# Patient Record
Sex: FEMALE | Race: WHITE | NOT HISPANIC OR LATINO
[De-identification: names, ages, dates, MRNs, and addresses within clinical notes are randomized per-mention and may not be internally consistent; named-entity substitution may affect disease eponyms.]

---

## 2017-01-07 ENCOUNTER — APPOINTMENT (OUTPATIENT)
Dept: ORTHOPEDIC SURGERY | Facility: CLINIC | Age: 57
End: 2017-01-07

## 2017-01-07 VITALS
SYSTOLIC BLOOD PRESSURE: 125 MMHG | WEIGHT: 194 LBS | HEIGHT: 68 IN | BODY MASS INDEX: 29.4 KG/M2 | HEART RATE: 61 BPM | DIASTOLIC BLOOD PRESSURE: 82 MMHG

## 2017-01-07 DIAGNOSIS — M17.0 BILATERAL PRIMARY OSTEOARTHRITIS OF KNEE: ICD-10-CM

## 2017-02-08 ENCOUNTER — OUTPATIENT (OUTPATIENT)
Dept: OUTPATIENT SERVICES | Facility: HOSPITAL | Age: 57
LOS: 1 days | End: 2017-02-08
Payer: COMMERCIAL

## 2017-02-08 VITALS
OXYGEN SATURATION: 97 % | WEIGHT: 197.09 LBS | HEART RATE: 70 BPM | SYSTOLIC BLOOD PRESSURE: 121 MMHG | TEMPERATURE: 98 F | HEIGHT: 66 IN | DIASTOLIC BLOOD PRESSURE: 82 MMHG

## 2017-02-08 DIAGNOSIS — Z98.890 OTHER SPECIFIED POSTPROCEDURAL STATES: Chronic | ICD-10-CM

## 2017-02-08 DIAGNOSIS — M17.0 BILATERAL PRIMARY OSTEOARTHRITIS OF KNEE: ICD-10-CM

## 2017-02-08 DIAGNOSIS — Z01.818 ENCOUNTER FOR OTHER PREPROCEDURAL EXAMINATION: ICD-10-CM

## 2017-02-08 DIAGNOSIS — Z98.891 HISTORY OF UTERINE SCAR FROM PREVIOUS SURGERY: Chronic | ICD-10-CM

## 2017-02-08 DIAGNOSIS — K11.9 DISEASE OF SALIVARY GLAND, UNSPECIFIED: Chronic | ICD-10-CM

## 2017-02-08 DIAGNOSIS — F17.200 NICOTINE DEPENDENCE, UNSPECIFIED, UNCOMPLICATED: ICD-10-CM

## 2017-02-08 LAB
ALBUMIN SERPL ELPH-MCNC: 3.7 G/DL — SIGNIFICANT CHANGE UP (ref 3.3–5)
ALP SERPL-CCNC: 68 U/L — SIGNIFICANT CHANGE UP (ref 30–120)
ALT FLD-CCNC: 24 U/L DA — SIGNIFICANT CHANGE UP (ref 10–60)
ANION GAP SERPL CALC-SCNC: 8 MMOL/L — SIGNIFICANT CHANGE UP (ref 5–17)
APTT BLD: 29.4 SEC — SIGNIFICANT CHANGE UP (ref 27.5–37.4)
AST SERPL-CCNC: 15 U/L — SIGNIFICANT CHANGE UP (ref 10–40)
BILIRUB SERPL-MCNC: 0.2 MG/DL — SIGNIFICANT CHANGE UP (ref 0.2–1.2)
BLD GP AB SCN SERPL QL: SIGNIFICANT CHANGE UP
BUN SERPL-MCNC: 14 MG/DL — SIGNIFICANT CHANGE UP (ref 7–23)
CALCIUM SERPL-MCNC: 9.3 MG/DL — SIGNIFICANT CHANGE UP (ref 8.4–10.5)
CHLORIDE SERPL-SCNC: 105 MMOL/L — SIGNIFICANT CHANGE UP (ref 96–108)
CO2 SERPL-SCNC: 27 MMOL/L — SIGNIFICANT CHANGE UP (ref 22–31)
CREAT SERPL-MCNC: 0.6 MG/DL — SIGNIFICANT CHANGE UP (ref 0.5–1.3)
GLUCOSE SERPL-MCNC: 108 MG/DL — HIGH (ref 70–99)
HCT VFR BLD CALC: 41.2 % — SIGNIFICANT CHANGE UP (ref 34.5–45)
HGB BLD-MCNC: 13.6 G/DL — SIGNIFICANT CHANGE UP (ref 11.5–15.5)
INR BLD: 0.96 RATIO — SIGNIFICANT CHANGE UP (ref 0.88–1.16)
MCHC RBC-ENTMCNC: 30 PG — SIGNIFICANT CHANGE UP (ref 27–34)
MCHC RBC-ENTMCNC: 33 GM/DL — SIGNIFICANT CHANGE UP (ref 32–36)
MCV RBC AUTO: 90.8 FL — SIGNIFICANT CHANGE UP (ref 80–100)
MRSA PCR RESULT.: SIGNIFICANT CHANGE UP
PLATELET # BLD AUTO: 247 K/UL — SIGNIFICANT CHANGE UP (ref 150–400)
POTASSIUM SERPL-MCNC: 3.9 MMOL/L — SIGNIFICANT CHANGE UP (ref 3.5–5.3)
POTASSIUM SERPL-SCNC: 3.9 MMOL/L — SIGNIFICANT CHANGE UP (ref 3.5–5.3)
PROT SERPL-MCNC: 7.1 G/DL — SIGNIFICANT CHANGE UP (ref 6–8.3)
PROTHROM AB SERPL-ACNC: 10.8 SEC — SIGNIFICANT CHANGE UP (ref 10–13.1)
RBC # BLD: 4.54 M/UL — SIGNIFICANT CHANGE UP (ref 3.8–5.2)
RBC # FLD: 13.6 % — SIGNIFICANT CHANGE UP (ref 10.3–14.5)
S AUREUS DNA NOSE QL NAA+PROBE: SIGNIFICANT CHANGE UP
SODIUM SERPL-SCNC: 140 MMOL/L — SIGNIFICANT CHANGE UP (ref 135–145)
WBC # BLD: 8.1 K/UL — SIGNIFICANT CHANGE UP (ref 3.8–10.5)
WBC # FLD AUTO: 8.1 K/UL — SIGNIFICANT CHANGE UP (ref 3.8–10.5)

## 2017-02-08 PROCEDURE — 93010 ELECTROCARDIOGRAM REPORT: CPT

## 2017-02-08 RX ORDER — DICLOFENAC SODIUM 75 MG/1
1 TABLET, DELAYED RELEASE ORAL
Qty: 0 | Refills: 0 | COMMUNITY

## 2017-02-08 NOTE — H&P PST ADULT - NSANTHOSAYNRD_GEN_A_CORE
No. ADDISON screening performed.  STOP BANG Legend: 0-2 = LOW Risk; 3-4 = INTERMEDIATE Risk; 5-8 = HIGH Risk

## 2017-02-08 NOTE — H&P PST ADULT - PROBLEM SELECTOR PLAN 1
'Right total knee replacement stage 1" on 3/1 and left total knee replacement on 3/6/17.  Diagnostic testing will be forwarded for medical clearance.  Pre op instructions weer reviewed and signed.  Anesthesia consult requested.

## 2017-02-08 NOTE — H&P PST ADULT - VENOUS THROMBOEMBOLISM FOR WOMEN ONLY
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

## 2017-02-08 NOTE — H&P PST ADULT - PMH
Obesity (BMI 30.0-34.9)    Osteoarthritis of both knees    Smoker    Spinal stenosis of lumbar region

## 2017-02-08 NOTE — H&P PST ADULT - MUSCULOSKELETAL
details… detailed exam decreased ROM to bilateral knees/no joint erythema/decreased ROM/no calf tenderness/decreased ROM due to pain/no joint warmth/joint swelling

## 2017-02-08 NOTE — H&P PST ADULT - RS GEN PE MLT RESP DETAILS PC
respirations non-labored/no subcutaneous emphysema/no rales/no rhonchi/no chest wall tenderness/breath sounds equal/no wheezes/no intercostal retractions/clear to auscultation bilaterally/good air movement

## 2017-02-08 NOTE — H&P PST ADULT - PSH
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

## 2017-02-08 NOTE — H&P PST ADULT - ASSESSMENT
55 yo female is scheduled for right total knee replacement stage 1 on 3/1/17 and left total knee replacement on 3/6/17.

## 2017-02-08 NOTE — H&P PST ADULT - NEGATIVE ENMT SYMPTOMS
no nasal discharge/no nose bleeds/no dry mouth/no throat pain/no abnormal taste sensation/no vertigo/no dysphagia/no hearing difficulty/no gum bleeding/no sinus symptoms/no ear pain/no nasal congestion/no nasal obstruction/no post-nasal discharge/no recurrent cold sores/no tinnitus

## 2017-02-08 NOTE — H&P PST ADULT - NEGATIVE NEUROLOGICAL SYMPTOMS
no generalized seizures/no vertigo/no transient paralysis/no weakness/no loss of sensation/no paresthesias/no tremors

## 2017-02-08 NOTE — H&P PST ADULT - HISTORY OF PRESENT ILLNESS
55 yo female is scheduled for "right total knee replacement stage 1" on 3/1/17 with Maury Rodriguez MD and left total knee replacement on 3/6/17  Patient complains of bilateral knee pain with osteoarthritis for 7 years, tried NSAIDs, cortisone, epidural, gel injections without relief. complains of 10/10 constant, radiating to back with swelling.

## 2017-02-08 NOTE — H&P PST ADULT - FAMILY HISTORY
Father  Still living? No  Family history of heart disease, Age at diagnosis: Age Unknown     Mother  Still living? No  Family history of diabetes mellitus, Age at diagnosis: Age Unknown

## 2017-02-17 RX ORDER — TRANEXAMIC ACID 100 MG/ML
1000 INJECTION, SOLUTION INTRAVENOUS ONCE
Qty: 0 | Refills: 0 | Status: COMPLETED | OUTPATIENT
Start: 2017-03-01 | End: 2017-03-01

## 2017-02-28 ENCOUNTER — RESULT REVIEW (OUTPATIENT)
Age: 57
End: 2017-02-28

## 2017-02-28 RX ORDER — SODIUM CHLORIDE 9 MG/ML
1000 INJECTION, SOLUTION INTRAVENOUS
Qty: 0 | Refills: 0 | Status: DISCONTINUED | OUTPATIENT
Start: 2017-03-01 | End: 2017-03-06

## 2017-02-28 RX ORDER — SENNA PLUS 8.6 MG/1
2 TABLET ORAL AT BEDTIME
Qty: 0 | Refills: 0 | Status: DISCONTINUED | OUTPATIENT
Start: 2017-03-01 | End: 2017-03-06

## 2017-02-28 RX ORDER — POLYETHYLENE GLYCOL 3350 17 G/17G
17 POWDER, FOR SOLUTION ORAL DAILY
Qty: 0 | Refills: 0 | Status: DISCONTINUED | OUTPATIENT
Start: 2017-03-01 | End: 2017-03-06

## 2017-02-28 RX ORDER — CELECOXIB 200 MG/1
200 CAPSULE ORAL ONCE
Qty: 0 | Refills: 0 | Status: COMPLETED | OUTPATIENT
Start: 2017-03-01 | End: 2017-03-01

## 2017-02-28 RX ORDER — APREPITANT 80 MG/1
40 CAPSULE ORAL ONCE
Qty: 0 | Refills: 0 | Status: COMPLETED | OUTPATIENT
Start: 2017-03-01 | End: 2017-03-01

## 2017-02-28 RX ORDER — MAGNESIUM HYDROXIDE 400 MG/1
30 TABLET, CHEWABLE ORAL DAILY
Qty: 0 | Refills: 0 | Status: DISCONTINUED | OUTPATIENT
Start: 2017-03-01 | End: 2017-03-06

## 2017-02-28 RX ORDER — OXYCODONE HYDROCHLORIDE 5 MG/1
10 TABLET ORAL ONCE
Qty: 0 | Refills: 0 | Status: DISCONTINUED | OUTPATIENT
Start: 2017-03-01 | End: 2017-03-01

## 2017-02-28 RX ORDER — DOCUSATE SODIUM 100 MG
100 CAPSULE ORAL THREE TIMES A DAY
Qty: 0 | Refills: 0 | Status: DISCONTINUED | OUTPATIENT
Start: 2017-03-01 | End: 2017-03-06

## 2017-02-28 RX ORDER — ONDANSETRON 8 MG/1
4 TABLET, FILM COATED ORAL EVERY 6 HOURS
Qty: 0 | Refills: 0 | Status: DISCONTINUED | OUTPATIENT
Start: 2017-03-01 | End: 2017-03-06

## 2017-03-01 ENCOUNTER — APPOINTMENT (OUTPATIENT)
Dept: ORTHOPEDIC SURGERY | Facility: HOSPITAL | Age: 57
End: 2017-03-01

## 2017-03-01 ENCOUNTER — INPATIENT (INPATIENT)
Facility: HOSPITAL | Age: 57
LOS: 6 days | Discharge: NURSING, REHAB OR LONG-TERM CARE | DRG: 462 | End: 2017-03-08
Attending: INTERNAL MEDICINE | Admitting: ORTHOPAEDIC SURGERY
Payer: COMMERCIAL

## 2017-03-01 VITALS
TEMPERATURE: 98 F | HEIGHT: 67.5 IN | WEIGHT: 205.47 LBS | DIASTOLIC BLOOD PRESSURE: 80 MMHG | HEART RATE: 77 BPM | OXYGEN SATURATION: 99 % | RESPIRATION RATE: 15 BRPM | SYSTOLIC BLOOD PRESSURE: 151 MMHG

## 2017-03-01 DIAGNOSIS — K11.9 DISEASE OF SALIVARY GLAND, UNSPECIFIED: Chronic | ICD-10-CM

## 2017-03-01 DIAGNOSIS — Z98.891 HISTORY OF UTERINE SCAR FROM PREVIOUS SURGERY: Chronic | ICD-10-CM

## 2017-03-01 DIAGNOSIS — M17.0 BILATERAL PRIMARY OSTEOARTHRITIS OF KNEE: ICD-10-CM

## 2017-03-01 DIAGNOSIS — Z98.890 OTHER SPECIFIED POSTPROCEDURAL STATES: Chronic | ICD-10-CM

## 2017-03-01 LAB
ABO RH CONFIRMATION: SIGNIFICANT CHANGE UP
ANION GAP SERPL CALC-SCNC: 6 MMOL/L — SIGNIFICANT CHANGE UP (ref 5–17)
BUN SERPL-MCNC: 9 MG/DL — SIGNIFICANT CHANGE UP (ref 7–23)
CALCIUM SERPL-MCNC: 8.8 MG/DL — SIGNIFICANT CHANGE UP (ref 8.4–10.5)
CHLORIDE SERPL-SCNC: 106 MMOL/L — SIGNIFICANT CHANGE UP (ref 96–108)
CO2 SERPL-SCNC: 27 MMOL/L — SIGNIFICANT CHANGE UP (ref 22–31)
CREAT SERPL-MCNC: 0.6 MG/DL — SIGNIFICANT CHANGE UP (ref 0.5–1.3)
GLUCOSE SERPL-MCNC: 198 MG/DL — HIGH (ref 70–99)
HCG UR QL: NEGATIVE — SIGNIFICANT CHANGE UP
HCT VFR BLD CALC: 35.3 % — SIGNIFICANT CHANGE UP (ref 34.5–45)
HGB BLD-MCNC: 11.6 G/DL — SIGNIFICANT CHANGE UP (ref 11.5–15.5)
POTASSIUM SERPL-MCNC: 4.1 MMOL/L — SIGNIFICANT CHANGE UP (ref 3.5–5.3)
POTASSIUM SERPL-SCNC: 4.1 MMOL/L — SIGNIFICANT CHANGE UP (ref 3.5–5.3)
SODIUM SERPL-SCNC: 139 MMOL/L — SIGNIFICANT CHANGE UP (ref 135–145)

## 2017-03-01 PROCEDURE — 27447 TOTAL KNEE ARTHROPLASTY: CPT | Mod: RT

## 2017-03-01 PROCEDURE — 27447 TOTAL KNEE ARTHROPLASTY: CPT | Mod: AS,LT

## 2017-03-01 PROCEDURE — 88305 TISSUE EXAM BY PATHOLOGIST: CPT | Mod: 26

## 2017-03-01 PROCEDURE — 88311 DECALCIFY TISSUE: CPT | Mod: 26

## 2017-03-01 PROCEDURE — 73562 X-RAY EXAM OF KNEE 3: CPT | Mod: 26,RT

## 2017-03-01 PROCEDURE — 99222 1ST HOSP IP/OBS MODERATE 55: CPT

## 2017-03-01 RX ORDER — HYDROMORPHONE HYDROCHLORIDE 2 MG/ML
0.5 INJECTION INTRAMUSCULAR; INTRAVENOUS; SUBCUTANEOUS
Qty: 0 | Refills: 0 | Status: DISCONTINUED | OUTPATIENT
Start: 2017-03-01 | End: 2017-03-06

## 2017-03-01 RX ORDER — HYDROMORPHONE HYDROCHLORIDE 2 MG/ML
0.5 INJECTION INTRAMUSCULAR; INTRAVENOUS; SUBCUTANEOUS
Qty: 0 | Refills: 0 | Status: DISCONTINUED | OUTPATIENT
Start: 2017-03-01 | End: 2017-03-01

## 2017-03-01 RX ORDER — ACETAMINOPHEN 500 MG
1000 TABLET ORAL ONCE
Qty: 0 | Refills: 0 | Status: COMPLETED | OUTPATIENT
Start: 2017-03-01 | End: 2017-03-01

## 2017-03-01 RX ORDER — CEFAZOLIN SODIUM 1 G
2000 VIAL (EA) INJECTION ONCE
Qty: 0 | Refills: 0 | Status: COMPLETED | OUTPATIENT
Start: 2017-03-01 | End: 2017-03-01

## 2017-03-01 RX ORDER — OXYCODONE HYDROCHLORIDE 5 MG/1
5 TABLET ORAL
Qty: 0 | Refills: 0 | Status: DISCONTINUED | OUTPATIENT
Start: 2017-03-01 | End: 2017-03-02

## 2017-03-01 RX ORDER — MEPERIDINE HYDROCHLORIDE 50 MG/ML
12.5 INJECTION INTRAMUSCULAR; INTRAVENOUS; SUBCUTANEOUS
Qty: 0 | Refills: 0 | Status: DISCONTINUED | OUTPATIENT
Start: 2017-03-01 | End: 2017-03-01

## 2017-03-01 RX ORDER — ACETAMINOPHEN 500 MG
1000 TABLET ORAL EVERY 8 HOURS
Qty: 0 | Refills: 0 | Status: DISCONTINUED | OUTPATIENT
Start: 2017-03-02 | End: 2017-03-06

## 2017-03-01 RX ORDER — OXYCODONE HYDROCHLORIDE 5 MG/1
10 TABLET ORAL
Qty: 0 | Refills: 0 | Status: DISCONTINUED | OUTPATIENT
Start: 2017-03-01 | End: 2017-03-02

## 2017-03-01 RX ORDER — PANTOPRAZOLE SODIUM 20 MG/1
40 TABLET, DELAYED RELEASE ORAL
Qty: 0 | Refills: 0 | Status: DISCONTINUED | OUTPATIENT
Start: 2017-03-01 | End: 2017-03-06

## 2017-03-01 RX ORDER — ENOXAPARIN SODIUM 100 MG/ML
30 INJECTION SUBCUTANEOUS EVERY 12 HOURS
Qty: 0 | Refills: 0 | Status: COMPLETED | OUTPATIENT
Start: 2017-03-02 | End: 2017-03-05

## 2017-03-01 RX ORDER — FAMOTIDINE 10 MG/ML
1 INJECTION INTRAVENOUS
Qty: 0 | Refills: 0 | COMMUNITY

## 2017-03-01 RX ORDER — CELECOXIB 200 MG/1
200 CAPSULE ORAL
Qty: 0 | Refills: 0 | Status: DISCONTINUED | OUTPATIENT
Start: 2017-03-01 | End: 2017-03-06

## 2017-03-01 RX ORDER — ACETAMINOPHEN 500 MG
1000 TABLET ORAL EVERY 6 HOURS
Qty: 0 | Refills: 0 | Status: COMPLETED | OUTPATIENT
Start: 2017-03-01 | End: 2017-03-02

## 2017-03-01 RX ORDER — SODIUM CHLORIDE 9 MG/ML
1000 INJECTION, SOLUTION INTRAVENOUS
Qty: 0 | Refills: 0 | Status: DISCONTINUED | OUTPATIENT
Start: 2017-03-01 | End: 2017-03-01

## 2017-03-01 RX ORDER — CEFAZOLIN SODIUM 1 G
2000 VIAL (EA) INJECTION EVERY 8 HOURS
Qty: 0 | Refills: 0 | Status: COMPLETED | OUTPATIENT
Start: 2017-03-01 | End: 2017-03-02

## 2017-03-01 RX ORDER — HYDROMORPHONE HYDROCHLORIDE 2 MG/ML
0.5 INJECTION INTRAMUSCULAR; INTRAVENOUS; SUBCUTANEOUS ONCE
Qty: 0 | Refills: 0 | Status: DISCONTINUED | OUTPATIENT
Start: 2017-03-01 | End: 2017-03-01

## 2017-03-01 RX ADMIN — HYDROMORPHONE HYDROCHLORIDE 0.5 MILLIGRAM(S): 2 INJECTION INTRAMUSCULAR; INTRAVENOUS; SUBCUTANEOUS at 17:55

## 2017-03-01 RX ADMIN — HYDROMORPHONE HYDROCHLORIDE 0.5 MILLIGRAM(S): 2 INJECTION INTRAMUSCULAR; INTRAVENOUS; SUBCUTANEOUS at 23:02

## 2017-03-01 RX ADMIN — Medication 1000 MILLIGRAM(S): at 20:17

## 2017-03-01 RX ADMIN — Medication 100 MILLIGRAM(S): at 19:45

## 2017-03-01 RX ADMIN — APREPITANT 40 MILLIGRAM(S): 80 CAPSULE ORAL at 10:29

## 2017-03-01 RX ADMIN — HYDROMORPHONE HYDROCHLORIDE 0.5 MILLIGRAM(S): 2 INJECTION INTRAMUSCULAR; INTRAVENOUS; SUBCUTANEOUS at 22:32

## 2017-03-01 RX ADMIN — CELECOXIB 200 MILLIGRAM(S): 200 CAPSULE ORAL at 10:27

## 2017-03-01 RX ADMIN — HYDROMORPHONE HYDROCHLORIDE 0.5 MILLIGRAM(S): 2 INJECTION INTRAMUSCULAR; INTRAVENOUS; SUBCUTANEOUS at 17:35

## 2017-03-01 RX ADMIN — Medication 400 MILLIGRAM(S): at 18:55

## 2017-03-01 RX ADMIN — OXYCODONE HYDROCHLORIDE 10 MILLIGRAM(S): 5 TABLET ORAL at 10:27

## 2017-03-01 RX ADMIN — HYDROMORPHONE HYDROCHLORIDE 0.5 MILLIGRAM(S): 2 INJECTION INTRAMUSCULAR; INTRAVENOUS; SUBCUTANEOUS at 21:34

## 2017-03-01 RX ADMIN — Medication 100 MILLIGRAM(S): at 21:35

## 2017-03-01 RX ADMIN — HYDROMORPHONE HYDROCHLORIDE 0.5 MILLIGRAM(S): 2 INJECTION INTRAMUSCULAR; INTRAVENOUS; SUBCUTANEOUS at 22:04

## 2017-03-02 LAB
ANION GAP SERPL CALC-SCNC: 5 MMOL/L — SIGNIFICANT CHANGE UP (ref 5–17)
BUN SERPL-MCNC: 10 MG/DL — SIGNIFICANT CHANGE UP (ref 7–23)
CALCIUM SERPL-MCNC: 8.7 MG/DL — SIGNIFICANT CHANGE UP (ref 8.4–10.5)
CHLORIDE SERPL-SCNC: 106 MMOL/L — SIGNIFICANT CHANGE UP (ref 96–108)
CO2 SERPL-SCNC: 28 MMOL/L — SIGNIFICANT CHANGE UP (ref 22–31)
CREAT SERPL-MCNC: 0.51 MG/DL — SIGNIFICANT CHANGE UP (ref 0.5–1.3)
GLUCOSE SERPL-MCNC: 97 MG/DL — SIGNIFICANT CHANGE UP (ref 70–99)
HCT VFR BLD CALC: 33.7 % — LOW (ref 34.5–45)
HGB BLD-MCNC: 11 G/DL — LOW (ref 11.5–15.5)
MCHC RBC-ENTMCNC: 30.1 PG — SIGNIFICANT CHANGE UP (ref 27–34)
MCHC RBC-ENTMCNC: 32.8 GM/DL — SIGNIFICANT CHANGE UP (ref 32–36)
MCV RBC AUTO: 91.9 FL — SIGNIFICANT CHANGE UP (ref 80–100)
PLATELET # BLD AUTO: 212 K/UL — SIGNIFICANT CHANGE UP (ref 150–400)
POTASSIUM SERPL-MCNC: 4.3 MMOL/L — SIGNIFICANT CHANGE UP (ref 3.5–5.3)
POTASSIUM SERPL-SCNC: 4.3 MMOL/L — SIGNIFICANT CHANGE UP (ref 3.5–5.3)
RBC # BLD: 3.66 M/UL — LOW (ref 3.8–5.2)
RBC # FLD: 13.6 % — SIGNIFICANT CHANGE UP (ref 10.3–14.5)
SODIUM SERPL-SCNC: 139 MMOL/L — SIGNIFICANT CHANGE UP (ref 135–145)
WBC # BLD: 9.1 K/UL — SIGNIFICANT CHANGE UP (ref 3.8–10.5)
WBC # FLD AUTO: 9.1 K/UL — SIGNIFICANT CHANGE UP (ref 3.8–10.5)

## 2017-03-02 PROCEDURE — 93971 EXTREMITY STUDY: CPT | Mod: 26,LT

## 2017-03-02 PROCEDURE — 99232 SBSQ HOSP IP/OBS MODERATE 35: CPT

## 2017-03-02 RX ORDER — OXYCODONE HYDROCHLORIDE 5 MG/1
15 TABLET ORAL
Qty: 0 | Refills: 0 | Status: DISCONTINUED | OUTPATIENT
Start: 2017-03-02 | End: 2017-03-06

## 2017-03-02 RX ORDER — HYDROMORPHONE HYDROCHLORIDE 2 MG/ML
1 INJECTION INTRAMUSCULAR; INTRAVENOUS; SUBCUTANEOUS ONCE
Qty: 0 | Refills: 0 | Status: DISCONTINUED | OUTPATIENT
Start: 2017-03-02 | End: 2017-03-02

## 2017-03-02 RX ORDER — OXYCODONE HYDROCHLORIDE 5 MG/1
10 TABLET ORAL
Qty: 0 | Refills: 0 | Status: DISCONTINUED | OUTPATIENT
Start: 2017-03-02 | End: 2017-03-06

## 2017-03-02 RX ADMIN — CELECOXIB 200 MILLIGRAM(S): 200 CAPSULE ORAL at 17:23

## 2017-03-02 RX ADMIN — OXYCODONE HYDROCHLORIDE 15 MILLIGRAM(S): 5 TABLET ORAL at 08:21

## 2017-03-02 RX ADMIN — ENOXAPARIN SODIUM 30 MILLIGRAM(S): 100 INJECTION SUBCUTANEOUS at 09:11

## 2017-03-02 RX ADMIN — OXYCODONE HYDROCHLORIDE 15 MILLIGRAM(S): 5 TABLET ORAL at 09:00

## 2017-03-02 RX ADMIN — OXYCODONE HYDROCHLORIDE 15 MILLIGRAM(S): 5 TABLET ORAL at 11:23

## 2017-03-02 RX ADMIN — SENNA PLUS 2 TABLET(S): 8.6 TABLET ORAL at 20:27

## 2017-03-02 RX ADMIN — Medication 100 MILLIGRAM(S): at 03:40

## 2017-03-02 RX ADMIN — CELECOXIB 200 MILLIGRAM(S): 200 CAPSULE ORAL at 10:00

## 2017-03-02 RX ADMIN — Medication 400 MILLIGRAM(S): at 01:08

## 2017-03-02 RX ADMIN — OXYCODONE HYDROCHLORIDE 10 MILLIGRAM(S): 5 TABLET ORAL at 05:33

## 2017-03-02 RX ADMIN — ENOXAPARIN SODIUM 30 MILLIGRAM(S): 100 INJECTION SUBCUTANEOUS at 20:27

## 2017-03-02 RX ADMIN — OXYCODONE HYDROCHLORIDE 15 MILLIGRAM(S): 5 TABLET ORAL at 23:37

## 2017-03-02 RX ADMIN — OXYCODONE HYDROCHLORIDE 10 MILLIGRAM(S): 5 TABLET ORAL at 02:21

## 2017-03-02 RX ADMIN — Medication 100 MILLIGRAM(S): at 05:33

## 2017-03-02 RX ADMIN — CELECOXIB 200 MILLIGRAM(S): 200 CAPSULE ORAL at 09:11

## 2017-03-02 RX ADMIN — Medication 1000 MILLIGRAM(S): at 13:37

## 2017-03-02 RX ADMIN — OXYCODONE HYDROCHLORIDE 15 MILLIGRAM(S): 5 TABLET ORAL at 18:07

## 2017-03-02 RX ADMIN — OXYCODONE HYDROCHLORIDE 15 MILLIGRAM(S): 5 TABLET ORAL at 17:25

## 2017-03-02 RX ADMIN — OXYCODONE HYDROCHLORIDE 15 MILLIGRAM(S): 5 TABLET ORAL at 15:00

## 2017-03-02 RX ADMIN — OXYCODONE HYDROCHLORIDE 15 MILLIGRAM(S): 5 TABLET ORAL at 20:57

## 2017-03-02 RX ADMIN — Medication 400 MILLIGRAM(S): at 06:24

## 2017-03-02 RX ADMIN — OXYCODONE HYDROCHLORIDE 10 MILLIGRAM(S): 5 TABLET ORAL at 02:51

## 2017-03-02 RX ADMIN — OXYCODONE HYDROCHLORIDE 10 MILLIGRAM(S): 5 TABLET ORAL at 06:03

## 2017-03-02 RX ADMIN — CELECOXIB 200 MILLIGRAM(S): 200 CAPSULE ORAL at 18:05

## 2017-03-02 RX ADMIN — OXYCODONE HYDROCHLORIDE 15 MILLIGRAM(S): 5 TABLET ORAL at 12:00

## 2017-03-02 RX ADMIN — Medication 1000 MILLIGRAM(S): at 20:26

## 2017-03-02 RX ADMIN — HYDROMORPHONE HYDROCHLORIDE 1 MILLIGRAM(S): 2 INJECTION INTRAMUSCULAR; INTRAVENOUS; SUBCUTANEOUS at 04:43

## 2017-03-02 RX ADMIN — HYDROMORPHONE HYDROCHLORIDE 1 MILLIGRAM(S): 2 INJECTION INTRAMUSCULAR; INTRAVENOUS; SUBCUTANEOUS at 04:13

## 2017-03-02 RX ADMIN — Medication 1000 MILLIGRAM(S): at 06:27

## 2017-03-02 RX ADMIN — Medication 1000 MILLIGRAM(S): at 14:15

## 2017-03-02 RX ADMIN — OXYCODONE HYDROCHLORIDE 15 MILLIGRAM(S): 5 TABLET ORAL at 14:20

## 2017-03-02 RX ADMIN — Medication 100 MILLIGRAM(S): at 20:26

## 2017-03-02 RX ADMIN — Medication 1000 MILLIGRAM(S): at 01:34

## 2017-03-02 RX ADMIN — Medication 1000 MILLIGRAM(S): at 23:41

## 2017-03-02 RX ADMIN — Medication 100 MILLIGRAM(S): at 13:37

## 2017-03-02 RX ADMIN — OXYCODONE HYDROCHLORIDE 15 MILLIGRAM(S): 5 TABLET ORAL at 20:27

## 2017-03-03 LAB
ANION GAP SERPL CALC-SCNC: 5 MMOL/L — SIGNIFICANT CHANGE UP (ref 5–17)
BUN SERPL-MCNC: 10 MG/DL — SIGNIFICANT CHANGE UP (ref 7–23)
CALCIUM SERPL-MCNC: 9 MG/DL — SIGNIFICANT CHANGE UP (ref 8.4–10.5)
CHLORIDE SERPL-SCNC: 105 MMOL/L — SIGNIFICANT CHANGE UP (ref 96–108)
CO2 SERPL-SCNC: 28 MMOL/L — SIGNIFICANT CHANGE UP (ref 22–31)
CREAT SERPL-MCNC: 0.45 MG/DL — LOW (ref 0.5–1.3)
GLUCOSE SERPL-MCNC: 131 MG/DL — HIGH (ref 70–99)
HCT VFR BLD CALC: 33.2 % — LOW (ref 34.5–45)
HGB BLD-MCNC: 11 G/DL — LOW (ref 11.5–15.5)
MCHC RBC-ENTMCNC: 30.3 PG — SIGNIFICANT CHANGE UP (ref 27–34)
MCHC RBC-ENTMCNC: 33.2 GM/DL — SIGNIFICANT CHANGE UP (ref 32–36)
MCV RBC AUTO: 91.3 FL — SIGNIFICANT CHANGE UP (ref 80–100)
PLATELET # BLD AUTO: 198 K/UL — SIGNIFICANT CHANGE UP (ref 150–400)
POTASSIUM SERPL-MCNC: 4.1 MMOL/L — SIGNIFICANT CHANGE UP (ref 3.5–5.3)
POTASSIUM SERPL-SCNC: 4.1 MMOL/L — SIGNIFICANT CHANGE UP (ref 3.5–5.3)
RBC # BLD: 3.64 M/UL — LOW (ref 3.8–5.2)
RBC # FLD: 13.1 % — SIGNIFICANT CHANGE UP (ref 10.3–14.5)
SODIUM SERPL-SCNC: 138 MMOL/L — SIGNIFICANT CHANGE UP (ref 135–145)
WBC # BLD: 9.4 K/UL — SIGNIFICANT CHANGE UP (ref 3.8–10.5)
WBC # FLD AUTO: 9.4 K/UL — SIGNIFICANT CHANGE UP (ref 3.8–10.5)

## 2017-03-03 PROCEDURE — 93971 EXTREMITY STUDY: CPT | Mod: 26,RT

## 2017-03-03 PROCEDURE — 99232 SBSQ HOSP IP/OBS MODERATE 35: CPT

## 2017-03-03 RX ADMIN — OXYCODONE HYDROCHLORIDE 15 MILLIGRAM(S): 5 TABLET ORAL at 21:11

## 2017-03-03 RX ADMIN — OXYCODONE HYDROCHLORIDE 15 MILLIGRAM(S): 5 TABLET ORAL at 08:56

## 2017-03-03 RX ADMIN — OXYCODONE HYDROCHLORIDE 15 MILLIGRAM(S): 5 TABLET ORAL at 18:01

## 2017-03-03 RX ADMIN — Medication 100 MILLIGRAM(S): at 05:56

## 2017-03-03 RX ADMIN — OXYCODONE HYDROCHLORIDE 15 MILLIGRAM(S): 5 TABLET ORAL at 00:07

## 2017-03-03 RX ADMIN — OXYCODONE HYDROCHLORIDE 15 MILLIGRAM(S): 5 TABLET ORAL at 09:45

## 2017-03-03 RX ADMIN — Medication 1000 MILLIGRAM(S): at 13:45

## 2017-03-03 RX ADMIN — ENOXAPARIN SODIUM 30 MILLIGRAM(S): 100 INJECTION SUBCUTANEOUS at 08:58

## 2017-03-03 RX ADMIN — Medication 1000 MILLIGRAM(S): at 13:43

## 2017-03-03 RX ADMIN — OXYCODONE HYDROCHLORIDE 15 MILLIGRAM(S): 5 TABLET ORAL at 21:41

## 2017-03-03 RX ADMIN — OXYCODONE HYDROCHLORIDE 15 MILLIGRAM(S): 5 TABLET ORAL at 03:52

## 2017-03-03 RX ADMIN — Medication 100 MILLIGRAM(S): at 13:42

## 2017-03-03 RX ADMIN — Medication 1000 MILLIGRAM(S): at 21:09

## 2017-03-03 RX ADMIN — Medication 1000 MILLIGRAM(S): at 05:55

## 2017-03-03 RX ADMIN — SENNA PLUS 2 TABLET(S): 8.6 TABLET ORAL at 21:12

## 2017-03-03 RX ADMIN — CELECOXIB 200 MILLIGRAM(S): 200 CAPSULE ORAL at 18:01

## 2017-03-03 RX ADMIN — CELECOXIB 200 MILLIGRAM(S): 200 CAPSULE ORAL at 09:01

## 2017-03-03 RX ADMIN — Medication 100 MILLIGRAM(S): at 21:11

## 2017-03-03 RX ADMIN — OXYCODONE HYDROCHLORIDE 15 MILLIGRAM(S): 5 TABLET ORAL at 13:43

## 2017-03-03 RX ADMIN — OXYCODONE HYDROCHLORIDE 15 MILLIGRAM(S): 5 TABLET ORAL at 19:01

## 2017-03-03 RX ADMIN — Medication 1000 MILLIGRAM(S): at 23:29

## 2017-03-03 RX ADMIN — Medication 1000 MILLIGRAM(S): at 05:56

## 2017-03-03 RX ADMIN — ENOXAPARIN SODIUM 30 MILLIGRAM(S): 100 INJECTION SUBCUTANEOUS at 21:12

## 2017-03-03 RX ADMIN — CELECOXIB 200 MILLIGRAM(S): 200 CAPSULE ORAL at 18:04

## 2017-03-03 RX ADMIN — OXYCODONE HYDROCHLORIDE 15 MILLIGRAM(S): 5 TABLET ORAL at 04:22

## 2017-03-03 RX ADMIN — OXYCODONE HYDROCHLORIDE 15 MILLIGRAM(S): 5 TABLET ORAL at 14:40

## 2017-03-03 RX ADMIN — CELECOXIB 200 MILLIGRAM(S): 200 CAPSULE ORAL at 08:58

## 2017-03-04 LAB
ANION GAP SERPL CALC-SCNC: 3 MMOL/L — LOW (ref 5–17)
BUN SERPL-MCNC: 10 MG/DL — SIGNIFICANT CHANGE UP (ref 7–23)
CALCIUM SERPL-MCNC: 9.2 MG/DL — SIGNIFICANT CHANGE UP (ref 8.4–10.5)
CHLORIDE SERPL-SCNC: 105 MMOL/L — SIGNIFICANT CHANGE UP (ref 96–108)
CO2 SERPL-SCNC: 28 MMOL/L — SIGNIFICANT CHANGE UP (ref 22–31)
CREAT SERPL-MCNC: 0.63 MG/DL — SIGNIFICANT CHANGE UP (ref 0.5–1.3)
GLUCOSE SERPL-MCNC: 121 MG/DL — HIGH (ref 70–99)
HCT VFR BLD CALC: 32.3 % — LOW (ref 34.5–45)
HGB BLD-MCNC: 10.7 G/DL — LOW (ref 11.5–15.5)
MCHC RBC-ENTMCNC: 30.8 PG — SIGNIFICANT CHANGE UP (ref 27–34)
MCHC RBC-ENTMCNC: 33.2 GM/DL — SIGNIFICANT CHANGE UP (ref 32–36)
MCV RBC AUTO: 92.6 FL — SIGNIFICANT CHANGE UP (ref 80–100)
PLATELET # BLD AUTO: 219 K/UL — SIGNIFICANT CHANGE UP (ref 150–400)
POTASSIUM SERPL-MCNC: 4 MMOL/L — SIGNIFICANT CHANGE UP (ref 3.5–5.3)
POTASSIUM SERPL-SCNC: 4 MMOL/L — SIGNIFICANT CHANGE UP (ref 3.5–5.3)
RBC # BLD: 3.49 M/UL — LOW (ref 3.8–5.2)
RBC # FLD: 13.3 % — SIGNIFICANT CHANGE UP (ref 10.3–14.5)
SODIUM SERPL-SCNC: 136 MMOL/L — SIGNIFICANT CHANGE UP (ref 135–145)
WBC # BLD: 7.8 K/UL — SIGNIFICANT CHANGE UP (ref 3.8–10.5)
WBC # FLD AUTO: 7.8 K/UL — SIGNIFICANT CHANGE UP (ref 3.8–10.5)

## 2017-03-04 PROCEDURE — 99233 SBSQ HOSP IP/OBS HIGH 50: CPT

## 2017-03-04 RX ADMIN — ENOXAPARIN SODIUM 30 MILLIGRAM(S): 100 INJECTION SUBCUTANEOUS at 09:15

## 2017-03-04 RX ADMIN — CELECOXIB 200 MILLIGRAM(S): 200 CAPSULE ORAL at 18:10

## 2017-03-04 RX ADMIN — CELECOXIB 200 MILLIGRAM(S): 200 CAPSULE ORAL at 09:14

## 2017-03-04 RX ADMIN — Medication 1000 MILLIGRAM(S): at 14:47

## 2017-03-04 RX ADMIN — OXYCODONE HYDROCHLORIDE 15 MILLIGRAM(S): 5 TABLET ORAL at 17:00

## 2017-03-04 RX ADMIN — OXYCODONE HYDROCHLORIDE 15 MILLIGRAM(S): 5 TABLET ORAL at 19:55

## 2017-03-04 RX ADMIN — OXYCODONE HYDROCHLORIDE 15 MILLIGRAM(S): 5 TABLET ORAL at 10:10

## 2017-03-04 RX ADMIN — Medication 1000 MILLIGRAM(S): at 04:56

## 2017-03-04 RX ADMIN — OXYCODONE HYDROCHLORIDE 15 MILLIGRAM(S): 5 TABLET ORAL at 23:10

## 2017-03-04 RX ADMIN — ENOXAPARIN SODIUM 30 MILLIGRAM(S): 100 INJECTION SUBCUTANEOUS at 21:43

## 2017-03-04 RX ADMIN — SENNA PLUS 2 TABLET(S): 8.6 TABLET ORAL at 21:42

## 2017-03-04 RX ADMIN — Medication 1000 MILLIGRAM(S): at 04:57

## 2017-03-04 RX ADMIN — Medication 1000 MILLIGRAM(S): at 14:46

## 2017-03-04 RX ADMIN — POLYETHYLENE GLYCOL 3350 17 GRAM(S): 17 POWDER, FOR SOLUTION ORAL at 05:03

## 2017-03-04 RX ADMIN — Medication 100 MILLIGRAM(S): at 14:46

## 2017-03-04 RX ADMIN — OXYCODONE HYDROCHLORIDE 15 MILLIGRAM(S): 5 TABLET ORAL at 22:15

## 2017-03-04 RX ADMIN — Medication 100 MILLIGRAM(S): at 04:56

## 2017-03-04 RX ADMIN — OXYCODONE HYDROCHLORIDE 15 MILLIGRAM(S): 5 TABLET ORAL at 16:03

## 2017-03-04 RX ADMIN — OXYCODONE HYDROCHLORIDE 15 MILLIGRAM(S): 5 TABLET ORAL at 04:57

## 2017-03-04 RX ADMIN — Medication 1000 MILLIGRAM(S): at 21:47

## 2017-03-04 RX ADMIN — OXYCODONE HYDROCHLORIDE 15 MILLIGRAM(S): 5 TABLET ORAL at 19:15

## 2017-03-04 RX ADMIN — CELECOXIB 200 MILLIGRAM(S): 200 CAPSULE ORAL at 10:00

## 2017-03-04 RX ADMIN — Medication 100 MILLIGRAM(S): at 21:42

## 2017-03-04 RX ADMIN — OXYCODONE HYDROCHLORIDE 15 MILLIGRAM(S): 5 TABLET ORAL at 09:15

## 2017-03-04 RX ADMIN — Medication 1000 MILLIGRAM(S): at 21:43

## 2017-03-04 RX ADMIN — OXYCODONE HYDROCHLORIDE 15 MILLIGRAM(S): 5 TABLET ORAL at 05:27

## 2017-03-04 NOTE — DIETITIAN INITIAL EVALUATION ADULT. - OTHER INFO
Pt denies any recent wt change but states she's been heavier 220#, is allergic to peaches and walnuts, at home takes fish oil, Vit D, Vit C, hasn't had a BM yet but nursing gave her something for it and feels like she might be able to go soon. plans on exercising more once surgeries are completed, Pt had R knee replacement surgery on wed and L knee replacement surgery anticipated for this coming monday. Pt with pmhx as below. Pt is obese appearing. Pt appreciative for conversation about diet and exercise and supporting written information. Pt reminded not to start and exercise regimen without advise of her doctor.

## 2017-03-04 NOTE — DIETITIAN INITIAL EVALUATION ADULT. - PERTINENT LABORATORY DATA
( 2-17-17) albumin 3.8 - indicative of adequate visceral protein status ( 3-4-16) glucose 126 slight elevation likely postprandial and/or stress response related

## 2017-03-05 ENCOUNTER — RESULT REVIEW (OUTPATIENT)
Age: 57
End: 2017-03-05

## 2017-03-05 LAB
ANION GAP SERPL CALC-SCNC: 5 MMOL/L — SIGNIFICANT CHANGE UP (ref 5–17)
APTT BLD: 28.1 SEC — SIGNIFICANT CHANGE UP (ref 27.5–37.4)
BUN SERPL-MCNC: 16 MG/DL — SIGNIFICANT CHANGE UP (ref 7–23)
CALCIUM SERPL-MCNC: 9.2 MG/DL — SIGNIFICANT CHANGE UP (ref 8.4–10.5)
CHLORIDE SERPL-SCNC: 105 MMOL/L — SIGNIFICANT CHANGE UP (ref 96–108)
CO2 SERPL-SCNC: 31 MMOL/L — SIGNIFICANT CHANGE UP (ref 22–31)
CREAT SERPL-MCNC: 0.56 MG/DL — SIGNIFICANT CHANGE UP (ref 0.5–1.3)
GLUCOSE SERPL-MCNC: 116 MG/DL — HIGH (ref 70–99)
HCT VFR BLD CALC: 30.1 % — LOW (ref 34.5–45)
HGB BLD-MCNC: 9.7 G/DL — LOW (ref 11.5–15.5)
INR BLD: 0.97 RATIO — SIGNIFICANT CHANGE UP (ref 0.88–1.16)
MCHC RBC-ENTMCNC: 29.6 PG — SIGNIFICANT CHANGE UP (ref 27–34)
MCHC RBC-ENTMCNC: 32.2 GM/DL — SIGNIFICANT CHANGE UP (ref 32–36)
MCV RBC AUTO: 92.1 FL — SIGNIFICANT CHANGE UP (ref 80–100)
PLATELET # BLD AUTO: 209 K/UL — SIGNIFICANT CHANGE UP (ref 150–400)
POTASSIUM SERPL-MCNC: 4.2 MMOL/L — SIGNIFICANT CHANGE UP (ref 3.5–5.3)
POTASSIUM SERPL-SCNC: 4.2 MMOL/L — SIGNIFICANT CHANGE UP (ref 3.5–5.3)
PROTHROM AB SERPL-ACNC: 10.9 SEC — SIGNIFICANT CHANGE UP (ref 10–13.1)
RBC # BLD: 3.26 M/UL — LOW (ref 3.8–5.2)
RBC # FLD: 12.9 % — SIGNIFICANT CHANGE UP (ref 10.3–14.5)
SODIUM SERPL-SCNC: 141 MMOL/L — SIGNIFICANT CHANGE UP (ref 135–145)
WBC # BLD: 7.1 K/UL — SIGNIFICANT CHANGE UP (ref 3.8–10.5)
WBC # FLD AUTO: 7.1 K/UL — SIGNIFICANT CHANGE UP (ref 3.8–10.5)

## 2017-03-05 PROCEDURE — 99233 SBSQ HOSP IP/OBS HIGH 50: CPT

## 2017-03-05 RX ORDER — DOCUSATE SODIUM 100 MG
100 CAPSULE ORAL THREE TIMES A DAY
Qty: 0 | Refills: 0 | Status: DISCONTINUED | OUTPATIENT
Start: 2017-03-06 | End: 2017-03-08

## 2017-03-05 RX ORDER — ONDANSETRON 8 MG/1
4 TABLET, FILM COATED ORAL EVERY 6 HOURS
Qty: 0 | Refills: 0 | Status: DISCONTINUED | OUTPATIENT
Start: 2017-03-06 | End: 2017-03-08

## 2017-03-05 RX ORDER — MAGNESIUM HYDROXIDE 400 MG/1
30 TABLET, CHEWABLE ORAL DAILY
Qty: 0 | Refills: 0 | Status: DISCONTINUED | OUTPATIENT
Start: 2017-03-06 | End: 2017-03-08

## 2017-03-05 RX ORDER — SODIUM CHLORIDE 9 MG/ML
1000 INJECTION, SOLUTION INTRAVENOUS
Qty: 0 | Refills: 0 | Status: DISCONTINUED | OUTPATIENT
Start: 2017-03-06 | End: 2017-03-08

## 2017-03-05 RX ORDER — SENNA PLUS 8.6 MG/1
2 TABLET ORAL AT BEDTIME
Qty: 0 | Refills: 0 | Status: DISCONTINUED | OUTPATIENT
Start: 2017-03-06 | End: 2017-03-08

## 2017-03-05 RX ORDER — POLYETHYLENE GLYCOL 3350 17 G/17G
17 POWDER, FOR SOLUTION ORAL DAILY
Qty: 0 | Refills: 0 | Status: DISCONTINUED | OUTPATIENT
Start: 2017-03-06 | End: 2017-03-08

## 2017-03-05 RX ADMIN — Medication 1000 MILLIGRAM(S): at 05:35

## 2017-03-05 RX ADMIN — Medication 1000 MILLIGRAM(S): at 12:32

## 2017-03-05 RX ADMIN — CELECOXIB 200 MILLIGRAM(S): 200 CAPSULE ORAL at 10:13

## 2017-03-05 RX ADMIN — OXYCODONE HYDROCHLORIDE 15 MILLIGRAM(S): 5 TABLET ORAL at 19:00

## 2017-03-05 RX ADMIN — OXYCODONE HYDROCHLORIDE 15 MILLIGRAM(S): 5 TABLET ORAL at 07:04

## 2017-03-05 RX ADMIN — OXYCODONE HYDROCHLORIDE 15 MILLIGRAM(S): 5 TABLET ORAL at 08:00

## 2017-03-05 RX ADMIN — Medication 100 MILLIGRAM(S): at 21:47

## 2017-03-05 RX ADMIN — Medication 1000 MILLIGRAM(S): at 22:20

## 2017-03-05 RX ADMIN — Medication 1000 MILLIGRAM(S): at 05:37

## 2017-03-05 RX ADMIN — Medication 1000 MILLIGRAM(S): at 12:30

## 2017-03-05 RX ADMIN — Medication 100 MILLIGRAM(S): at 12:30

## 2017-03-05 RX ADMIN — ENOXAPARIN SODIUM 30 MILLIGRAM(S): 100 INJECTION SUBCUTANEOUS at 10:13

## 2017-03-05 RX ADMIN — OXYCODONE HYDROCHLORIDE 15 MILLIGRAM(S): 5 TABLET ORAL at 03:29

## 2017-03-05 RX ADMIN — OXYCODONE HYDROCHLORIDE 15 MILLIGRAM(S): 5 TABLET ORAL at 04:20

## 2017-03-05 RX ADMIN — OXYCODONE HYDROCHLORIDE 15 MILLIGRAM(S): 5 TABLET ORAL at 21:47

## 2017-03-05 RX ADMIN — PANTOPRAZOLE SODIUM 40 MILLIGRAM(S): 20 TABLET, DELAYED RELEASE ORAL at 07:04

## 2017-03-05 RX ADMIN — OXYCODONE HYDROCHLORIDE 15 MILLIGRAM(S): 5 TABLET ORAL at 22:20

## 2017-03-05 RX ADMIN — Medication 100 MILLIGRAM(S): at 05:35

## 2017-03-05 RX ADMIN — OXYCODONE HYDROCHLORIDE 15 MILLIGRAM(S): 5 TABLET ORAL at 12:30

## 2017-03-05 RX ADMIN — Medication 1000 MILLIGRAM(S): at 21:47

## 2017-03-05 RX ADMIN — OXYCODONE HYDROCHLORIDE 15 MILLIGRAM(S): 5 TABLET ORAL at 18:00

## 2017-03-05 RX ADMIN — SENNA PLUS 2 TABLET(S): 8.6 TABLET ORAL at 21:47

## 2017-03-05 RX ADMIN — CELECOXIB 200 MILLIGRAM(S): 200 CAPSULE ORAL at 18:00

## 2017-03-05 RX ADMIN — CELECOXIB 200 MILLIGRAM(S): 200 CAPSULE ORAL at 18:44

## 2017-03-05 RX ADMIN — OXYCODONE HYDROCHLORIDE 15 MILLIGRAM(S): 5 TABLET ORAL at 13:30

## 2017-03-06 ENCOUNTER — APPOINTMENT (OUTPATIENT)
Dept: ORTHOPEDIC SURGERY | Facility: HOSPITAL | Age: 57
End: 2017-03-06

## 2017-03-06 LAB
ANION GAP SERPL CALC-SCNC: 6 MMOL/L — SIGNIFICANT CHANGE UP (ref 5–17)
BUN SERPL-MCNC: 14 MG/DL — SIGNIFICANT CHANGE UP (ref 7–23)
CALCIUM SERPL-MCNC: 9.1 MG/DL — SIGNIFICANT CHANGE UP (ref 8.4–10.5)
CHLORIDE SERPL-SCNC: 103 MMOL/L — SIGNIFICANT CHANGE UP (ref 96–108)
CO2 SERPL-SCNC: 30 MMOL/L — SIGNIFICANT CHANGE UP (ref 22–31)
CREAT SERPL-MCNC: 0.49 MG/DL — LOW (ref 0.5–1.3)
GLUCOSE SERPL-MCNC: 115 MG/DL — HIGH (ref 70–99)
HCT VFR BLD CALC: 29.5 % — LOW (ref 34.5–45)
HGB BLD-MCNC: 10 G/DL — LOW (ref 11.5–15.5)
MCHC RBC-ENTMCNC: 30.4 PG — SIGNIFICANT CHANGE UP (ref 27–34)
MCHC RBC-ENTMCNC: 34 GM/DL — SIGNIFICANT CHANGE UP (ref 32–36)
MCV RBC AUTO: 89.4 FL — SIGNIFICANT CHANGE UP (ref 80–100)
PLATELET # BLD AUTO: 269 K/UL — SIGNIFICANT CHANGE UP (ref 150–400)
POTASSIUM SERPL-MCNC: 4.1 MMOL/L — SIGNIFICANT CHANGE UP (ref 3.5–5.3)
POTASSIUM SERPL-SCNC: 4.1 MMOL/L — SIGNIFICANT CHANGE UP (ref 3.5–5.3)
RBC # BLD: 3.3 M/UL — LOW (ref 3.8–5.2)
RBC # FLD: 13 % — SIGNIFICANT CHANGE UP (ref 10.3–14.5)
SODIUM SERPL-SCNC: 139 MMOL/L — SIGNIFICANT CHANGE UP (ref 135–145)
WBC # BLD: 9.8 K/UL — SIGNIFICANT CHANGE UP (ref 3.8–10.5)
WBC # FLD AUTO: 9.8 K/UL — SIGNIFICANT CHANGE UP (ref 3.8–10.5)

## 2017-03-06 PROCEDURE — 88311 DECALCIFY TISSUE: CPT | Mod: 26

## 2017-03-06 PROCEDURE — 88305 TISSUE EXAM BY PATHOLOGIST: CPT | Mod: 26

## 2017-03-06 PROCEDURE — 99233 SBSQ HOSP IP/OBS HIGH 50: CPT

## 2017-03-06 PROCEDURE — 27447 TOTAL KNEE ARTHROPLASTY: CPT | Mod: 58,LT

## 2017-03-06 PROCEDURE — 27447 TOTAL KNEE ARTHROPLASTY: CPT | Mod: 82,58,LT

## 2017-03-06 PROCEDURE — 73562 X-RAY EXAM OF KNEE 3: CPT | Mod: 26,LT

## 2017-03-06 RX ORDER — OXYCODONE HYDROCHLORIDE 5 MG/1
15 TABLET ORAL
Qty: 0 | Refills: 0 | Status: DISCONTINUED | OUTPATIENT
Start: 2017-03-06 | End: 2017-03-08

## 2017-03-06 RX ORDER — SODIUM CHLORIDE 9 MG/ML
1000 INJECTION, SOLUTION INTRAVENOUS
Qty: 0 | Refills: 0 | Status: DISCONTINUED | OUTPATIENT
Start: 2017-03-06 | End: 2017-03-06

## 2017-03-06 RX ORDER — ACETAMINOPHEN 500 MG
1000 TABLET ORAL EVERY 8 HOURS
Qty: 0 | Refills: 0 | Status: DISCONTINUED | OUTPATIENT
Start: 2017-03-07 | End: 2017-03-08

## 2017-03-06 RX ORDER — CELECOXIB 200 MG/1
200 CAPSULE ORAL
Qty: 0 | Refills: 0 | Status: DISCONTINUED | OUTPATIENT
Start: 2017-03-06 | End: 2017-03-08

## 2017-03-06 RX ORDER — VANCOMYCIN HCL 1 G
1500 VIAL (EA) INTRAVENOUS ONCE
Qty: 0 | Refills: 0 | Status: COMPLETED | OUTPATIENT
Start: 2017-03-06 | End: 2017-03-06

## 2017-03-06 RX ORDER — OXYCODONE HYDROCHLORIDE 5 MG/1
10 TABLET ORAL
Qty: 0 | Refills: 0 | Status: DISCONTINUED | OUTPATIENT
Start: 2017-03-06 | End: 2017-03-08

## 2017-03-06 RX ORDER — TRANEXAMIC ACID 100 MG/ML
1000 INJECTION, SOLUTION INTRAVENOUS ONCE
Qty: 0 | Refills: 0 | Status: COMPLETED | OUTPATIENT
Start: 2017-03-06 | End: 2017-03-06

## 2017-03-06 RX ORDER — APIXABAN 2.5 MG/1
2.5 TABLET, FILM COATED ORAL EVERY 12 HOURS
Qty: 0 | Refills: 0 | Status: DISCONTINUED | OUTPATIENT
Start: 2017-03-07 | End: 2017-03-08

## 2017-03-06 RX ORDER — ACETAMINOPHEN 500 MG
1000 TABLET ORAL EVERY 6 HOURS
Qty: 0 | Refills: 0 | Status: COMPLETED | OUTPATIENT
Start: 2017-03-06 | End: 2017-03-07

## 2017-03-06 RX ORDER — ONDANSETRON 8 MG/1
4 TABLET, FILM COATED ORAL ONCE
Qty: 0 | Refills: 0 | Status: COMPLETED | OUTPATIENT
Start: 2017-03-06 | End: 2017-03-06

## 2017-03-06 RX ORDER — HYDROMORPHONE HYDROCHLORIDE 2 MG/ML
0.5 INJECTION INTRAMUSCULAR; INTRAVENOUS; SUBCUTANEOUS
Qty: 0 | Refills: 0 | Status: DISCONTINUED | OUTPATIENT
Start: 2017-03-06 | End: 2017-03-08

## 2017-03-06 RX ORDER — PANTOPRAZOLE SODIUM 20 MG/1
40 TABLET, DELAYED RELEASE ORAL
Qty: 0 | Refills: 0 | Status: DISCONTINUED | OUTPATIENT
Start: 2017-03-06 | End: 2017-03-08

## 2017-03-06 RX ORDER — HYDROMORPHONE HYDROCHLORIDE 2 MG/ML
0.5 INJECTION INTRAMUSCULAR; INTRAVENOUS; SUBCUTANEOUS
Qty: 0 | Refills: 0 | Status: DISCONTINUED | OUTPATIENT
Start: 2017-03-06 | End: 2017-03-06

## 2017-03-06 RX ADMIN — OXYCODONE HYDROCHLORIDE 15 MILLIGRAM(S): 5 TABLET ORAL at 16:52

## 2017-03-06 RX ADMIN — Medication 100 MILLIGRAM(S): at 05:19

## 2017-03-06 RX ADMIN — HYDROMORPHONE HYDROCHLORIDE 0.5 MILLIGRAM(S): 2 INJECTION INTRAMUSCULAR; INTRAVENOUS; SUBCUTANEOUS at 10:34

## 2017-03-06 RX ADMIN — OXYCODONE HYDROCHLORIDE 15 MILLIGRAM(S): 5 TABLET ORAL at 13:07

## 2017-03-06 RX ADMIN — Medication 400 MILLIGRAM(S): at 14:03

## 2017-03-06 RX ADMIN — HYDROMORPHONE HYDROCHLORIDE 0.5 MILLIGRAM(S): 2 INJECTION INTRAMUSCULAR; INTRAVENOUS; SUBCUTANEOUS at 14:03

## 2017-03-06 RX ADMIN — OXYCODONE HYDROCHLORIDE 15 MILLIGRAM(S): 5 TABLET ORAL at 16:38

## 2017-03-06 RX ADMIN — SODIUM CHLORIDE 100 MILLILITER(S): 9 INJECTION, SOLUTION INTRAVENOUS at 06:09

## 2017-03-06 RX ADMIN — HYDROMORPHONE HYDROCHLORIDE 0.5 MILLIGRAM(S): 2 INJECTION INTRAMUSCULAR; INTRAVENOUS; SUBCUTANEOUS at 14:09

## 2017-03-06 RX ADMIN — Medication 1000 MILLIGRAM(S): at 14:04

## 2017-03-06 RX ADMIN — OXYCODONE HYDROCHLORIDE 10 MILLIGRAM(S): 5 TABLET ORAL at 07:31

## 2017-03-06 RX ADMIN — OXYCODONE HYDROCHLORIDE 10 MILLIGRAM(S): 5 TABLET ORAL at 07:30

## 2017-03-06 RX ADMIN — Medication 300 MILLIGRAM(S): at 19:31

## 2017-03-06 RX ADMIN — HYDROMORPHONE HYDROCHLORIDE 0.5 MILLIGRAM(S): 2 INJECTION INTRAMUSCULAR; INTRAVENOUS; SUBCUTANEOUS at 11:11

## 2017-03-06 RX ADMIN — CELECOXIB 200 MILLIGRAM(S): 200 CAPSULE ORAL at 07:25

## 2017-03-06 RX ADMIN — HYDROMORPHONE HYDROCHLORIDE 0.5 MILLIGRAM(S): 2 INJECTION INTRAMUSCULAR; INTRAVENOUS; SUBCUTANEOUS at 19:08

## 2017-03-06 RX ADMIN — Medication 400 MILLIGRAM(S): at 20:52

## 2017-03-06 RX ADMIN — HYDROMORPHONE HYDROCHLORIDE 0.5 MILLIGRAM(S): 2 INJECTION INTRAMUSCULAR; INTRAVENOUS; SUBCUTANEOUS at 18:50

## 2017-03-06 RX ADMIN — Medication 1000 MILLIGRAM(S): at 04:16

## 2017-03-06 RX ADMIN — OXYCODONE HYDROCHLORIDE 15 MILLIGRAM(S): 5 TABLET ORAL at 20:55

## 2017-03-06 RX ADMIN — OXYCODONE HYDROCHLORIDE 15 MILLIGRAM(S): 5 TABLET ORAL at 21:23

## 2017-03-06 RX ADMIN — Medication 1000 MILLIGRAM(S): at 20:52

## 2017-03-06 RX ADMIN — OXYCODONE HYDROCHLORIDE 15 MILLIGRAM(S): 5 TABLET ORAL at 04:46

## 2017-03-06 RX ADMIN — OXYCODONE HYDROCHLORIDE 15 MILLIGRAM(S): 5 TABLET ORAL at 04:16

## 2017-03-06 RX ADMIN — OXYCODONE HYDROCHLORIDE 15 MILLIGRAM(S): 5 TABLET ORAL at 07:27

## 2017-03-06 RX ADMIN — OXYCODONE HYDROCHLORIDE 15 MILLIGRAM(S): 5 TABLET ORAL at 23:55

## 2017-03-06 RX ADMIN — PANTOPRAZOLE SODIUM 40 MILLIGRAM(S): 20 TABLET, DELAYED RELEASE ORAL at 05:19

## 2017-03-06 RX ADMIN — ONDANSETRON 4 MILLIGRAM(S): 8 TABLET, FILM COATED ORAL at 10:22

## 2017-03-06 RX ADMIN — Medication 1000 MILLIGRAM(S): at 04:46

## 2017-03-06 NOTE — PHYSICAL THERAPY INITIAL EVALUATION ADULT - IMPAIRMENTS CONTRIBUTING TO GAIT DEVIATIONS, PT EVAL
decreased ROM/impaired balance/decreased strength/decreased sensation
decreased ROM/pain/decreased strength

## 2017-03-06 NOTE — PHYSICAL THERAPY INITIAL EVALUATION ADULT - RANGE OF MOTION EXAMINATION, REHAB EVAL
deficits as listed below/R knee 0-60 deg/Left LE ROM was WFL (within functional limits)/bilateral upper extremity ROM was WFL (within functional limits)
bilateral upper extremity ROM was WFL (within functional limits)/R knee 0-60 deg, L knee 0-50 deg/deficits as listed below

## 2017-03-06 NOTE — PHYSICAL THERAPY INITIAL EVALUATION ADULT - CRITERIA FOR SKILLED THERAPEUTIC INTERVENTIONS
functional limitations in following categories/impairments found
functional limitations in following categories/impairments found

## 2017-03-06 NOTE — PHYSICAL THERAPY INITIAL EVALUATION ADULT - PLANNED THERAPY INTERVENTIONS, PT EVAL
bed mobility training/gait training/ROM/transfer training
bed mobility training/ROM/transfer training/gait training

## 2017-03-06 NOTE — OCCUPATIONAL THERAPY INITIAL EVALUATION ADULT - TRANSFER TRAINING, PT EVAL
Patient will transfer to toilet with DME as needed with minimal assistance within 2-3 sessions.
Patient will transfer to toilet with DME as needed with minimal assistance within 3-5 sessions.

## 2017-03-06 NOTE — OCCUPATIONAL THERAPY INITIAL EVALUATION ADULT - TRANSFER SAFETY CONCERNS NOTED: SIT/STAND, REHAB EVAL
decreased balance during turns/decreased safety awareness/decreased weight-shifting ability
decreased weight-shifting ability/decreased balance during turns/decreased safety awareness

## 2017-03-06 NOTE — OCCUPATIONAL THERAPY INITIAL EVALUATION ADULT - BED MOBILITY LIMITATIONS, REHAB EVAL
decreased ability to use legs for bridging/pushing
decreased ability to use legs for bridging/pushing

## 2017-03-06 NOTE — OCCUPATIONAL THERAPY INITIAL EVALUATION ADULT - ADDITIONAL COMMENTS
Pt lives with spouse and family in a condo 1 flight of stairs to bedroom + railing. Pt owns a SAC
Pt lives with spouse and family in a condo 1 flight of stairs to bedroom + railing. Pt owns a SAC

## 2017-03-06 NOTE — PHYSICAL THERAPY INITIAL EVALUATION ADULT - IMPAIRED TRANSFERS: SIT/STAND, REHAB EVAL
decreased sensation/impaired balance/decreased strength/decreased ROM
pain/impaired balance/decreased ROM/decreased strength

## 2017-03-06 NOTE — PHYSICAL THERAPY INITIAL EVALUATION ADULT - TRANSFER SAFETY CONCERNS NOTED: SIT/STAND, REHAB EVAL
decreased weight-shifting ability
inability to maintain weight-bearing restrictions w/o assist/losing balance

## 2017-03-06 NOTE — PHYSICAL THERAPY INITIAL EVALUATION ADULT - GAIT DEVIATIONS NOTED, PT EVAL
decreased step length/decreased anjum/decreased weight-shifting ability/decreased stride length/decreased velocity of limb motion
decreased anjum/decreased step length/decreased stride length/decreased velocity of limb motion/decreased weight-shifting ability

## 2017-03-06 NOTE — PHYSICAL THERAPY INITIAL EVALUATION ADULT - GAIT TRAINING, PT EVAL
Goals 5-7 days, Pt will ambulate 75 ft w/ rolling walker w/ CGA
Goals 3-5 days, Pt will ambulate 50 ft w/ rolling walker w/ CGA

## 2017-03-06 NOTE — OCCUPATIONAL THERAPY INITIAL EVALUATION ADULT - ADL RETRAINING, OT EVAL
Patient will dress lower body with minimal assistance, AE as needed within 2-3 sessions.
Patient will dress lower body with minimal assistance, AE as needed within 3-5 sessions.

## 2017-03-06 NOTE — OCCUPATIONAL THERAPY INITIAL EVALUATION ADULT - NS ASR FOLLOW COMMAND OT EVAL
100% of the time
100% of the time/Pt educated regarding fall prevention in hospital and recommendation to use call bell/ask for assistance with all ADL's/transfers etc.

## 2017-03-06 NOTE — OCCUPATIONAL THERAPY INITIAL EVALUATION ADULT - RANGE OF MOTION EXAMINATION, UPPER EXTREMITY
bilateral UE Active ROM was WFL  (within functional limits)
bilateral UE Active ROM was WFL  (within functional limits)

## 2017-03-06 NOTE — PHYSICAL THERAPY INITIAL EVALUATION ADULT - BED MOBILITY TRAINING, PT EVAL
Goals 5-7 days, Pt will perform bed mobility with CGA
Goals 3-5 days, Pt will perform bed mobility with CGA

## 2017-03-06 NOTE — PHYSICAL THERAPY INITIAL EVALUATION ADULT - ADDITIONAL COMMENTS
Pt lives with family a condo with 1 flight of steps with rail to bedroom.  Pt has a straight cane
Pt lives with family a condo with 1 flight of steps with rail to bedroom.  Pt has a straight cane

## 2017-03-06 NOTE — PHYSICAL THERAPY INITIAL EVALUATION ADULT - TRANSFER TRAINING, PT EVAL
Goals 5-7 days, Pt will perform sit to stand w/ rolling walker w/ CGA
Goals 3-5 days, Pt will perform sit to stand w/ rolling walker w/ CGA

## 2017-03-07 ENCOUNTER — TRANSCRIPTION ENCOUNTER (OUTPATIENT)
Age: 57
End: 2017-03-07

## 2017-03-07 LAB
ANION GAP SERPL CALC-SCNC: 7 MMOL/L — SIGNIFICANT CHANGE UP (ref 5–17)
BUN SERPL-MCNC: 9 MG/DL — SIGNIFICANT CHANGE UP (ref 7–23)
CALCIUM SERPL-MCNC: 8.9 MG/DL — SIGNIFICANT CHANGE UP (ref 8.4–10.5)
CHLORIDE SERPL-SCNC: 107 MMOL/L — SIGNIFICANT CHANGE UP (ref 96–108)
CO2 SERPL-SCNC: 28 MMOL/L — SIGNIFICANT CHANGE UP (ref 22–31)
CREAT SERPL-MCNC: 0.59 MG/DL — SIGNIFICANT CHANGE UP (ref 0.5–1.3)
GLUCOSE SERPL-MCNC: 116 MG/DL — HIGH (ref 70–99)
HCT VFR BLD CALC: 26.2 % — LOW (ref 34.5–45)
HGB BLD-MCNC: 8.4 G/DL — LOW (ref 11.5–15.5)
MCHC RBC-ENTMCNC: 29.5 PG — SIGNIFICANT CHANGE UP (ref 27–34)
MCHC RBC-ENTMCNC: 32.1 GM/DL — SIGNIFICANT CHANGE UP (ref 32–36)
MCV RBC AUTO: 91.9 FL — SIGNIFICANT CHANGE UP (ref 80–100)
PLATELET # BLD AUTO: 228 K/UL — SIGNIFICANT CHANGE UP (ref 150–400)
POTASSIUM SERPL-MCNC: 3.9 MMOL/L — SIGNIFICANT CHANGE UP (ref 3.5–5.3)
POTASSIUM SERPL-SCNC: 3.9 MMOL/L — SIGNIFICANT CHANGE UP (ref 3.5–5.3)
RBC # BLD: 2.86 M/UL — LOW (ref 3.8–5.2)
RBC # FLD: 12.6 % — SIGNIFICANT CHANGE UP (ref 10.3–14.5)
SODIUM SERPL-SCNC: 142 MMOL/L — SIGNIFICANT CHANGE UP (ref 135–145)
WBC # BLD: 7.4 K/UL — SIGNIFICANT CHANGE UP (ref 3.8–10.5)
WBC # FLD AUTO: 7.4 K/UL — SIGNIFICANT CHANGE UP (ref 3.8–10.5)

## 2017-03-07 PROCEDURE — 99232 SBSQ HOSP IP/OBS MODERATE 35: CPT

## 2017-03-07 RX ORDER — DIAZEPAM 5 MG
5 TABLET ORAL EVERY 8 HOURS
Qty: 0 | Refills: 0 | Status: DISCONTINUED | OUTPATIENT
Start: 2017-03-07 | End: 2017-03-08

## 2017-03-07 RX ADMIN — Medication 1000 MILLIGRAM(S): at 03:17

## 2017-03-07 RX ADMIN — OXYCODONE HYDROCHLORIDE 15 MILLIGRAM(S): 5 TABLET ORAL at 10:10

## 2017-03-07 RX ADMIN — OXYCODONE HYDROCHLORIDE 15 MILLIGRAM(S): 5 TABLET ORAL at 18:38

## 2017-03-07 RX ADMIN — Medication 1000 MILLIGRAM(S): at 09:16

## 2017-03-07 RX ADMIN — Medication 5 MILLIGRAM(S): at 13:49

## 2017-03-07 RX ADMIN — OXYCODONE HYDROCHLORIDE 15 MILLIGRAM(S): 5 TABLET ORAL at 15:19

## 2017-03-07 RX ADMIN — OXYCODONE HYDROCHLORIDE 15 MILLIGRAM(S): 5 TABLET ORAL at 00:30

## 2017-03-07 RX ADMIN — APIXABAN 2.5 MILLIGRAM(S): 2.5 TABLET, FILM COATED ORAL at 21:29

## 2017-03-07 RX ADMIN — OXYCODONE HYDROCHLORIDE 15 MILLIGRAM(S): 5 TABLET ORAL at 06:30

## 2017-03-07 RX ADMIN — CELECOXIB 200 MILLIGRAM(S): 200 CAPSULE ORAL at 09:17

## 2017-03-07 RX ADMIN — Medication 100 MILLIGRAM(S): at 15:20

## 2017-03-07 RX ADMIN — Medication 5 MILLIGRAM(S): at 23:16

## 2017-03-07 RX ADMIN — Medication 400 MILLIGRAM(S): at 03:14

## 2017-03-07 RX ADMIN — OXYCODONE HYDROCHLORIDE 15 MILLIGRAM(S): 5 TABLET ORAL at 05:55

## 2017-03-07 RX ADMIN — Medication 100 MILLIGRAM(S): at 05:54

## 2017-03-07 RX ADMIN — CELECOXIB 200 MILLIGRAM(S): 200 CAPSULE ORAL at 18:38

## 2017-03-07 RX ADMIN — POLYETHYLENE GLYCOL 3350 17 GRAM(S): 17 POWDER, FOR SOLUTION ORAL at 12:12

## 2017-03-07 RX ADMIN — Medication 1000 MILLIGRAM(S): at 18:38

## 2017-03-07 RX ADMIN — OXYCODONE HYDROCHLORIDE 15 MILLIGRAM(S): 5 TABLET ORAL at 19:16

## 2017-03-07 RX ADMIN — APIXABAN 2.5 MILLIGRAM(S): 2.5 TABLET, FILM COATED ORAL at 09:16

## 2017-03-07 RX ADMIN — CELECOXIB 200 MILLIGRAM(S): 200 CAPSULE ORAL at 17:24

## 2017-03-07 RX ADMIN — OXYCODONE HYDROCHLORIDE 15 MILLIGRAM(S): 5 TABLET ORAL at 22:02

## 2017-03-07 RX ADMIN — OXYCODONE HYDROCHLORIDE 15 MILLIGRAM(S): 5 TABLET ORAL at 16:15

## 2017-03-07 RX ADMIN — PANTOPRAZOLE SODIUM 40 MILLIGRAM(S): 20 TABLET, DELAYED RELEASE ORAL at 05:54

## 2017-03-07 RX ADMIN — OXYCODONE HYDROCHLORIDE 15 MILLIGRAM(S): 5 TABLET ORAL at 13:10

## 2017-03-07 RX ADMIN — OXYCODONE HYDROCHLORIDE 15 MILLIGRAM(S): 5 TABLET ORAL at 03:14

## 2017-03-07 RX ADMIN — OXYCODONE HYDROCHLORIDE 15 MILLIGRAM(S): 5 TABLET ORAL at 12:12

## 2017-03-07 RX ADMIN — OXYCODONE HYDROCHLORIDE 15 MILLIGRAM(S): 5 TABLET ORAL at 03:50

## 2017-03-07 RX ADMIN — Medication 1000 MILLIGRAM(S): at 17:24

## 2017-03-07 RX ADMIN — CELECOXIB 200 MILLIGRAM(S): 200 CAPSULE ORAL at 09:15

## 2017-03-07 RX ADMIN — OXYCODONE HYDROCHLORIDE 15 MILLIGRAM(S): 5 TABLET ORAL at 09:16

## 2017-03-07 RX ADMIN — OXYCODONE HYDROCHLORIDE 15 MILLIGRAM(S): 5 TABLET ORAL at 21:30

## 2017-03-07 RX ADMIN — Medication 100 MILLIGRAM(S): at 21:30

## 2017-03-07 NOTE — DISCHARGE NOTE ADULT - CARE PLAN
Principal Discharge DX:	S/P TKR (total knee replacement) using cement, bilateral  Goal:	Improve quality of life  Instructions for follow-up, activity and diet:	Physical Therapy/Occupational Therapy for ambulation, transfers, stairs, ADL's, Range of Motion Exercises, Isometrics.  Full weight bearing as tolerated with rolling walker  Range of Motion Goals: Flexion 120 degrees; Extension 0 degrees  Keep incision clean and dry.  Suture/prineo dressing removal 14 days after surgery at rehab facility or Surgeon's office  May shower post-op day #5 if no drainage from incision  Instructions for follow-up, activity and diet:	- Use CPM 2 hours 3 times a day  - Start daily @ 6AM.  - Settings: Right Knee: Extension = 0; Flexion now at 90 degrees  - Advance 5-10degrees each session as tolerated  to goal 120 degrees  - Consider Pain meds prior to CPM  -  Apply ice to knee Post CPM

## 2017-03-07 NOTE — DISCHARGE NOTE ADULT - PATIENT PORTAL LINK FT
“You can access the FollowHealth Patient Portal, offered by [facility], by registering with the following website: [email or web address]”

## 2017-03-07 NOTE — DISCHARGE NOTE ADULT - PROVIDER TOKENS
SOPHIE:'[id number]' FREE:[LAST:[Maury Carballo MD],PHONE:[(   )    -],FAX:[(   )    -],ADDRESS:[Maury Carballo MD  [address] [place] [address],   [address]  [place] [address]  Tel: [phone]  Fax: [phone]]]

## 2017-03-07 NOTE — DISCHARGE NOTE ADULT - CARE PROVIDERS DIRECT ADDRESSES
[email or web address],DirectAddress_Unknown ,DirectAddress_Unknown,DirectAddress_Unknown

## 2017-03-07 NOTE — DISCHARGE NOTE ADULT - PLAN OF CARE
Improve quality of life Physical Therapy/Occupational Therapy for ambulation, transfers, stairs, ADL's, Range of Motion Exercises, Isometrics.  Full weight bearing as tolerated with rolling walker  Range of Motion Goals: Flexion 120 degrees; Extension 0 degrees  Keep incision clean and dry.  Suture/prineo dressing removal 14 days after surgery at rehab facility or Surgeon's office  May shower post-op day #5 if no drainage from incision - Use CPM 2 hours 3 times a day  - Start daily @ 6AM.  - Settings: Right Knee: Extension = 0; Flexion now at 90 degrees  - Advance 5-10degrees each session as tolerated  to goal 120 degrees  - Consider Pain meds prior to CPM  -  Apply ice to knee Post CPM

## 2017-03-07 NOTE — DISCHARGE NOTE ADULT - NS AS ACTIVITY OBS
Stairs allowed/Walking-Outdoors allowed/Do not drive or operate machinery/Walking-Indoors allowed/No Heavy lifting/straining/Showering allowed

## 2017-03-07 NOTE — DISCHARGE NOTE ADULT - MEDICATION SUMMARY - MEDICATIONS TO TAKE
I will START or STAY ON the medications listed below when I get home from the hospital:    acetaminophen 500 mg oral tablet  -- 2 tab(s) by mouth every 8 hours  -- Indication: For Pain    oxyCODONE 10 mg oral tablet  -- 1 tab(s) by mouth every 4 hours (5 times/day), As Needed -Mild Pain  -- Indication: For Pain    oxyCODONE 15 mg oral tablet  -- 1 tab(s) by mouth every 4 hours (5 times/day), As Needed -Moderate Pain  -- Indication: For Pain    apixaban 2.5 mg oral tablet  -- 1 tab(s) by mouth every 12 hours x 10 days, then Ecotrin 325mg twice a day x 4 weeks   -- Indication: For dvt ppx    diazePAM 5 mg oral tablet  -- 1 tab(s) by mouth every 8 hours, As needed, muscle spasm  -- Indication: For muscle spasm     docusate sodium 100 mg oral capsule  -- 1 cap(s) by mouth 3 times a day  -- Indication: For contipation    polyethylene glycol 3350 oral powder for reconstitution  -- 17 gram(s) by mouth once a day  -- Indication: For contipation    senna oral tablet  -- 2 tab(s) by mouth once a day (at bedtime), As needed, Constipation  -- Indication: For contipation    pantoprazole 40 mg oral delayed release tablet  -- 1 tab(s) by mouth once a day (before a meal)  -- Indication: For gerd    Vitamin D3 2000 intl units oral capsule  -- 1 cap(s) by mouth once a day  -- Indication: For vitamin

## 2017-03-07 NOTE — DISCHARGE NOTE ADULT - CARE PROVIDER_API CALL
Maury Carballo), Orthopaedic Surgery  [address] [place] [address]  Phone: [phone]  Fax: [phone] Maury Carballo MD,   Maury Carballo MD  [address] [facility],   [address]  [place] [address]  Tel: [phone]  Fax: [phone]  Phone: (   )    -  Fax: (   )    -

## 2017-03-07 NOTE — DISCHARGE NOTE ADULT - HOSPITAL COURSE
This patient was admitted with severe osteoarthritis of bilateral knees. Patient was seen by Dr. Carballo and scheduled for bilateral staged total knee replacements.  After admission on 3/1/17 and receiving pre-operative parenteral prophylactic antibiotics, the patient  underwent an  uncomplicated right total knee replacement by Dr. Carballo.    A medical consultation from the Hospitalist service was obtained for post-operative medical co-management. Typical Physical & occupational therapy modalities were performed including ambulation training, range of motion, ADL's, and transfers. Lovenox was given for DVT prophylaxis.  The patient progressed well with physical and occupational therapy. They were re-evaluated by the medical hospitalist and deemed stable from a medical and orthopedic standpoint to undergo the second staged procedure.   After receiving the appropriate pareneteral antibiotic, the patient was taken back  to the operating room on 3/6/17 for an elective left total knee replacement, which was performed by Dr. Carballo.  Eliquis was given for DVT prophylaxis.  Anesthesia, Medical Hospitalist, Physical Therapy and Occupational Therapy were consulted. Patient is stable for discharge with a good prognosis.  Appropriate discharge instructions and medications are provided in this document.

## 2017-03-08 VITALS
DIASTOLIC BLOOD PRESSURE: 70 MMHG | OXYGEN SATURATION: 100 % | HEART RATE: 95 BPM | TEMPERATURE: 99 F | SYSTOLIC BLOOD PRESSURE: 114 MMHG | RESPIRATION RATE: 16 BRPM

## 2017-03-08 LAB
ANION GAP SERPL CALC-SCNC: 6 MMOL/L — SIGNIFICANT CHANGE UP (ref 5–17)
BUN SERPL-MCNC: 14 MG/DL — SIGNIFICANT CHANGE UP (ref 7–23)
CALCIUM SERPL-MCNC: 9 MG/DL — SIGNIFICANT CHANGE UP (ref 8.4–10.5)
CHLORIDE SERPL-SCNC: 105 MMOL/L — SIGNIFICANT CHANGE UP (ref 96–108)
CO2 SERPL-SCNC: 29 MMOL/L — SIGNIFICANT CHANGE UP (ref 22–31)
CREAT SERPL-MCNC: 0.61 MG/DL — SIGNIFICANT CHANGE UP (ref 0.5–1.3)
GLUCOSE SERPL-MCNC: 114 MG/DL — HIGH (ref 70–99)
HCT VFR BLD CALC: 28.1 % — LOW (ref 34.5–45)
HGB BLD-MCNC: 9.4 G/DL — LOW (ref 11.5–15.5)
MCHC RBC-ENTMCNC: 30.7 PG — SIGNIFICANT CHANGE UP (ref 27–34)
MCHC RBC-ENTMCNC: 33.5 GM/DL — SIGNIFICANT CHANGE UP (ref 32–36)
MCV RBC AUTO: 91.5 FL — SIGNIFICANT CHANGE UP (ref 80–100)
PLATELET # BLD AUTO: 280 K/UL — SIGNIFICANT CHANGE UP (ref 150–400)
POTASSIUM SERPL-MCNC: 3.9 MMOL/L — SIGNIFICANT CHANGE UP (ref 3.5–5.3)
POTASSIUM SERPL-SCNC: 3.9 MMOL/L — SIGNIFICANT CHANGE UP (ref 3.5–5.3)
RBC # BLD: 3.07 M/UL — LOW (ref 3.8–5.2)
RBC # FLD: 13.5 % — SIGNIFICANT CHANGE UP (ref 10.3–14.5)
SODIUM SERPL-SCNC: 140 MMOL/L — SIGNIFICANT CHANGE UP (ref 135–145)
WBC # BLD: 8.6 K/UL — SIGNIFICANT CHANGE UP (ref 3.8–10.5)
WBC # FLD AUTO: 8.6 K/UL — SIGNIFICANT CHANGE UP (ref 3.8–10.5)

## 2017-03-08 PROCEDURE — 86901 BLOOD TYPING SEROLOGIC RH(D): CPT

## 2017-03-08 PROCEDURE — 81025 URINE PREGNANCY TEST: CPT

## 2017-03-08 PROCEDURE — 73562 X-RAY EXAM OF KNEE 3: CPT

## 2017-03-08 PROCEDURE — 97116 GAIT TRAINING THERAPY: CPT

## 2017-03-08 PROCEDURE — 97530 THERAPEUTIC ACTIVITIES: CPT

## 2017-03-08 PROCEDURE — 94664 DEMO&/EVAL PT USE INHALER: CPT

## 2017-03-08 PROCEDURE — 97161 PT EVAL LOW COMPLEX 20 MIN: CPT

## 2017-03-08 PROCEDURE — C1776: CPT

## 2017-03-08 PROCEDURE — 97535 SELF CARE MNGMENT TRAINING: CPT

## 2017-03-08 PROCEDURE — 93971 EXTREMITY STUDY: CPT

## 2017-03-08 PROCEDURE — 85730 THROMBOPLASTIN TIME PARTIAL: CPT

## 2017-03-08 PROCEDURE — 88305 TISSUE EXAM BY PATHOLOGIST: CPT

## 2017-03-08 PROCEDURE — C1889: CPT

## 2017-03-08 PROCEDURE — 88311 DECALCIFY TISSUE: CPT

## 2017-03-08 PROCEDURE — 85027 COMPLETE CBC AUTOMATED: CPT

## 2017-03-08 PROCEDURE — 80048 BASIC METABOLIC PNL TOTAL CA: CPT

## 2017-03-08 PROCEDURE — 97110 THERAPEUTIC EXERCISES: CPT

## 2017-03-08 PROCEDURE — 99238 HOSP IP/OBS DSCHRG MGMT 30/<: CPT

## 2017-03-08 PROCEDURE — 85018 HEMOGLOBIN: CPT

## 2017-03-08 PROCEDURE — C1713: CPT

## 2017-03-08 PROCEDURE — 86900 BLOOD TYPING SEROLOGIC ABO: CPT

## 2017-03-08 PROCEDURE — 97165 OT EVAL LOW COMPLEX 30 MIN: CPT

## 2017-03-08 PROCEDURE — 86850 RBC ANTIBODY SCREEN: CPT

## 2017-03-08 PROCEDURE — 85610 PROTHROMBIN TIME: CPT

## 2017-03-08 RX ORDER — APIXABAN 2.5 MG/1
1 TABLET, FILM COATED ORAL
Qty: 0 | Refills: 0 | DISCHARGE
Start: 2017-03-08

## 2017-03-08 RX ORDER — OXYCODONE HYDROCHLORIDE 5 MG/1
1 TABLET ORAL
Qty: 0 | Refills: 0 | DISCHARGE
Start: 2017-03-08

## 2017-03-08 RX ORDER — ACETAMINOPHEN 500 MG
2 TABLET ORAL
Qty: 0 | Refills: 0 | DISCHARGE
Start: 2017-03-08

## 2017-03-08 RX ORDER — POLYETHYLENE GLYCOL 3350 17 G/17G
17 POWDER, FOR SOLUTION ORAL
Qty: 0 | Refills: 0 | DISCHARGE
Start: 2017-03-08

## 2017-03-08 RX ORDER — DIAZEPAM 5 MG
1 TABLET ORAL
Qty: 0 | Refills: 0 | DISCHARGE
Start: 2017-03-08

## 2017-03-08 RX ORDER — PANTOPRAZOLE SODIUM 20 MG/1
1 TABLET, DELAYED RELEASE ORAL
Qty: 0 | Refills: 0 | DISCHARGE
Start: 2017-03-08

## 2017-03-08 RX ORDER — DOCUSATE SODIUM 100 MG
1 CAPSULE ORAL
Qty: 0 | Refills: 0 | DISCHARGE
Start: 2017-03-08

## 2017-03-08 RX ORDER — SENNA PLUS 8.6 MG/1
2 TABLET ORAL
Qty: 0 | Refills: 0 | DISCHARGE
Start: 2017-03-08

## 2017-03-08 RX ADMIN — APIXABAN 2.5 MILLIGRAM(S): 2.5 TABLET, FILM COATED ORAL at 08:47

## 2017-03-08 RX ADMIN — Medication 1000 MILLIGRAM(S): at 02:02

## 2017-03-08 RX ADMIN — CELECOXIB 200 MILLIGRAM(S): 200 CAPSULE ORAL at 08:47

## 2017-03-08 RX ADMIN — OXYCODONE HYDROCHLORIDE 15 MILLIGRAM(S): 5 TABLET ORAL at 08:53

## 2017-03-08 RX ADMIN — Medication 100 MILLIGRAM(S): at 05:56

## 2017-03-08 RX ADMIN — OXYCODONE HYDROCHLORIDE 15 MILLIGRAM(S): 5 TABLET ORAL at 11:48

## 2017-03-08 RX ADMIN — OXYCODONE HYDROCHLORIDE 15 MILLIGRAM(S): 5 TABLET ORAL at 05:56

## 2017-03-08 RX ADMIN — OXYCODONE HYDROCHLORIDE 15 MILLIGRAM(S): 5 TABLET ORAL at 14:53

## 2017-03-08 RX ADMIN — Medication 1000 MILLIGRAM(S): at 03:23

## 2017-03-08 RX ADMIN — OXYCODONE HYDROCHLORIDE 15 MILLIGRAM(S): 5 TABLET ORAL at 14:54

## 2017-03-08 RX ADMIN — Medication 1000 MILLIGRAM(S): at 08:48

## 2017-03-08 RX ADMIN — PANTOPRAZOLE SODIUM 40 MILLIGRAM(S): 20 TABLET, DELAYED RELEASE ORAL at 05:56

## 2017-03-08 RX ADMIN — OXYCODONE HYDROCHLORIDE 15 MILLIGRAM(S): 5 TABLET ORAL at 06:24

## 2017-03-08 RX ADMIN — OXYCODONE HYDROCHLORIDE 15 MILLIGRAM(S): 5 TABLET ORAL at 01:55

## 2017-03-08 RX ADMIN — OXYCODONE HYDROCHLORIDE 15 MILLIGRAM(S): 5 TABLET ORAL at 02:30

## 2017-03-08 RX ADMIN — Medication 5 MILLIGRAM(S): at 08:47

## 2017-03-20 ENCOUNTER — APPOINTMENT (OUTPATIENT)
Dept: ORTHOPEDIC SURGERY | Facility: CLINIC | Age: 57
End: 2017-03-20

## 2017-03-20 RX ORDER — AMOXICILLIN 500 MG/1
500 CAPSULE ORAL
Qty: 20 | Refills: 4 | Status: ACTIVE | COMMUNITY
Start: 2017-03-20 | End: 1900-01-01

## 2017-04-10 ENCOUNTER — CHART COPY (OUTPATIENT)
Age: 57
End: 2017-04-10

## 2017-04-13 PROCEDURE — 36415 COLL VENOUS BLD VENIPUNCTURE: CPT

## 2017-04-13 PROCEDURE — 85610 PROTHROMBIN TIME: CPT

## 2017-04-13 PROCEDURE — 87641 MR-STAPH DNA AMP PROBE: CPT

## 2017-04-13 PROCEDURE — 85730 THROMBOPLASTIN TIME PARTIAL: CPT

## 2017-04-13 PROCEDURE — 80053 COMPREHEN METABOLIC PANEL: CPT

## 2017-04-13 PROCEDURE — G0463: CPT

## 2017-04-13 PROCEDURE — 85027 COMPLETE CBC AUTOMATED: CPT

## 2017-04-13 PROCEDURE — 86901 BLOOD TYPING SEROLOGIC RH(D): CPT

## 2017-04-13 PROCEDURE — 86900 BLOOD TYPING SEROLOGIC ABO: CPT

## 2017-04-13 PROCEDURE — 87640 STAPH A DNA AMP PROBE: CPT

## 2017-04-13 PROCEDURE — 86850 RBC ANTIBODY SCREEN: CPT

## 2017-04-13 PROCEDURE — 93005 ELECTROCARDIOGRAM TRACING: CPT

## 2017-04-24 ENCOUNTER — APPOINTMENT (OUTPATIENT)
Dept: ORTHOPEDIC SURGERY | Facility: CLINIC | Age: 57
End: 2017-04-24

## 2017-04-24 VITALS — HEIGHT: 68 IN | WEIGHT: 195 LBS | BODY MASS INDEX: 29.55 KG/M2

## 2017-06-06 ENCOUNTER — APPOINTMENT (OUTPATIENT)
Dept: ORTHOPEDIC SURGERY | Facility: CLINIC | Age: 57
End: 2017-06-06

## 2017-06-06 VITALS
DIASTOLIC BLOOD PRESSURE: 79 MMHG | HEIGHT: 68 IN | SYSTOLIC BLOOD PRESSURE: 119 MMHG | BODY MASS INDEX: 34.56 KG/M2 | HEART RATE: 73 BPM | WEIGHT: 228 LBS

## 2017-06-06 RX ORDER — OXYCODONE 5 MG/1
5 TABLET ORAL
Qty: 84 | Refills: 0 | Status: DISCONTINUED | COMMUNITY
Start: 2017-03-20 | End: 2017-06-06

## 2017-06-06 RX ORDER — OXYCODONE 5 MG/1
5 TABLET ORAL
Qty: 60 | Refills: 0 | Status: DISCONTINUED | COMMUNITY
Start: 2017-04-10 | End: 2017-06-06

## 2017-09-19 ENCOUNTER — APPOINTMENT (OUTPATIENT)
Dept: ORTHOPEDIC SURGERY | Facility: CLINIC | Age: 57
End: 2017-09-19
Payer: COMMERCIAL

## 2017-09-19 PROCEDURE — 99213 OFFICE O/P EST LOW 20 MIN: CPT

## 2017-09-19 PROCEDURE — 73562 X-RAY EXAM OF KNEE 3: CPT | Mod: 50

## 2017-10-04 ENCOUNTER — APPOINTMENT (OUTPATIENT)
Dept: ORTHOPEDIC SURGERY | Facility: CLINIC | Age: 57
End: 2017-10-04
Payer: COMMERCIAL

## 2017-10-04 VITALS
SYSTOLIC BLOOD PRESSURE: 118 MMHG | HEART RATE: 64 BPM | BODY MASS INDEX: 36.22 KG/M2 | DIASTOLIC BLOOD PRESSURE: 77 MMHG | WEIGHT: 239 LBS | HEIGHT: 68 IN

## 2017-10-04 DIAGNOSIS — M48.06 SPINAL STENOSIS, LUMBAR REGION: ICD-10-CM

## 2017-10-04 PROCEDURE — 99214 OFFICE O/P EST MOD 30 MIN: CPT

## 2017-12-20 ENCOUNTER — OTHER (OUTPATIENT)
Age: 57
End: 2017-12-20

## 2018-03-06 ENCOUNTER — APPOINTMENT (OUTPATIENT)
Dept: ORTHOPEDIC SURGERY | Facility: CLINIC | Age: 58
End: 2018-03-06
Payer: COMMERCIAL

## 2018-03-06 VITALS
WEIGHT: 200 LBS | HEART RATE: 79 BPM | SYSTOLIC BLOOD PRESSURE: 123 MMHG | BODY MASS INDEX: 30.31 KG/M2 | HEIGHT: 68 IN | DIASTOLIC BLOOD PRESSURE: 80 MMHG

## 2018-03-06 DIAGNOSIS — Z96.653 PRESENCE OF ARTIFICIAL KNEE JOINT, BILATERAL: ICD-10-CM

## 2018-03-06 PROCEDURE — 99213 OFFICE O/P EST LOW 20 MIN: CPT

## 2018-03-06 PROCEDURE — 73562 X-RAY EXAM OF KNEE 3: CPT | Mod: 50

## 2018-12-11 ENCOUNTER — CHART COPY (OUTPATIENT)
Age: 58
End: 2018-12-11

## 2020-05-13 NOTE — DISCHARGE NOTE ADULT - LAUNCH MEDICATION RECONCILIATION
Please see new message from patient below  Routing to MD Dr Sandhu,    As per your instructions, I have been taking 1/2 of a mirtazapine until they run out, starting last Friday and tonight is the last one.     I have another medication question, this time about the protonix. I'm temporarily without insurance and can't afford the full price of it. The pharmacist recommended something similar over the counter, like prilosec otc.     Would that be okay to replace the protonix until I'm insured again?    You have me taking twice a day, if I take prilosec otc, should I take 2 times a day as well?    Let me know    Thanks,   Ravi Jernigan    <<-----Click here for Discharge Medication Review

## 2020-09-20 ENCOUNTER — TRANSCRIPTION ENCOUNTER (OUTPATIENT)
Age: 60
End: 2020-09-20

## 2021-02-08 RX ORDER — AMOXICILLIN 500 MG/1
500 CAPSULE ORAL
Qty: 20 | Refills: 2 | Status: ACTIVE | COMMUNITY
Start: 2018-03-06 | End: 1900-01-01

## 2021-03-10 PROBLEM — M17.0 BILATERAL PRIMARY OSTEOARTHRITIS OF KNEE: Chronic | Status: ACTIVE | Noted: 2017-02-08

## 2021-03-10 PROBLEM — E66.9 OBESITY, UNSPECIFIED: Chronic | Status: ACTIVE | Noted: 2017-02-08

## 2021-03-10 PROBLEM — M48.06 SPINAL STENOSIS, LUMBAR REGION: Chronic | Status: ACTIVE | Noted: 2017-02-08

## 2021-03-17 ENCOUNTER — APPOINTMENT (OUTPATIENT)
Dept: ORTHOPEDIC SURGERY | Facility: CLINIC | Age: 61
End: 2021-03-17
Payer: COMMERCIAL

## 2021-03-17 VITALS — HEART RATE: 82 BPM | DIASTOLIC BLOOD PRESSURE: 82 MMHG | SYSTOLIC BLOOD PRESSURE: 162 MMHG

## 2021-03-17 DIAGNOSIS — M54.16 RADICULOPATHY, LUMBAR REGION: ICD-10-CM

## 2021-03-17 DIAGNOSIS — M51.36 OTHER INTERVERTEBRAL DISC DEGENERATION, LUMBAR REGION: ICD-10-CM

## 2021-03-17 PROCEDURE — 99214 OFFICE O/P EST MOD 30 MIN: CPT

## 2021-03-17 PROCEDURE — 99072 ADDL SUPL MATRL&STAF TM PHE: CPT

## 2021-03-17 RX ORDER — METHYLPREDNISOLONE 4 MG/1
4 TABLET ORAL
Qty: 1 | Refills: 0 | Status: ACTIVE | COMMUNITY
Start: 2021-03-17 | End: 1900-01-01

## 2021-03-24 ENCOUNTER — APPOINTMENT (OUTPATIENT)
Dept: ULTRASOUND IMAGING | Facility: CLINIC | Age: 61
End: 2021-03-24

## 2021-07-29 ENCOUNTER — EMERGENCY (EMERGENCY)
Facility: HOSPITAL | Age: 61
LOS: 1 days | Discharge: HOME | End: 2021-07-29
Attending: EMERGENCY MEDICINE | Admitting: EMERGENCY MEDICINE
Payer: COMMERCIAL

## 2021-07-29 VITALS
DIASTOLIC BLOOD PRESSURE: 100 MMHG | TEMPERATURE: 98 F | HEIGHT: 67.5 IN | SYSTOLIC BLOOD PRESSURE: 150 MMHG | OXYGEN SATURATION: 95 % | WEIGHT: 210.1 LBS | HEART RATE: 104 BPM | RESPIRATION RATE: 20 BRPM

## 2021-07-29 VITALS
RESPIRATION RATE: 16 BRPM | HEART RATE: 78 BPM | OXYGEN SATURATION: 98 % | TEMPERATURE: 98 F | SYSTOLIC BLOOD PRESSURE: 122 MMHG | DIASTOLIC BLOOD PRESSURE: 72 MMHG

## 2021-07-29 DIAGNOSIS — Z98.891 HISTORY OF UTERINE SCAR FROM PREVIOUS SURGERY: Chronic | ICD-10-CM

## 2021-07-29 DIAGNOSIS — Z98.890 OTHER SPECIFIED POSTPROCEDURAL STATES: Chronic | ICD-10-CM

## 2021-07-29 DIAGNOSIS — K11.9 DISEASE OF SALIVARY GLAND, UNSPECIFIED: Chronic | ICD-10-CM

## 2021-07-29 DIAGNOSIS — Z96.659 PRESENCE OF UNSPECIFIED ARTIFICIAL KNEE JOINT: Chronic | ICD-10-CM

## 2021-07-29 PROCEDURE — 73502 X-RAY EXAM HIP UNI 2-3 VIEWS: CPT

## 2021-07-29 PROCEDURE — 73502 X-RAY EXAM HIP UNI 2-3 VIEWS: CPT | Mod: 26,RT

## 2021-07-29 PROCEDURE — 99284 EMERGENCY DEPT VISIT MOD MDM: CPT

## 2021-07-29 PROCEDURE — 99283 EMERGENCY DEPT VISIT LOW MDM: CPT | Mod: 25

## 2021-07-29 RX ORDER — CHOLECALCIFEROL (VITAMIN D3) 125 MCG
1 CAPSULE ORAL
Qty: 0 | Refills: 0 | DISCHARGE

## 2021-07-29 RX ORDER — LIDOCAINE 4 G/100G
1 CREAM TOPICAL ONCE
Refills: 0 | Status: COMPLETED | OUTPATIENT
Start: 2021-07-29 | End: 2021-07-29

## 2021-07-29 RX ADMIN — Medication 60 MILLIGRAM(S): at 19:00

## 2021-07-29 RX ADMIN — LIDOCAINE 1 PATCH: 4 CREAM TOPICAL at 19:01

## 2021-07-29 RX ADMIN — LIDOCAINE 1 PATCH: 4 CREAM TOPICAL at 19:30

## 2021-07-29 NOTE — ED ADULT TRIAGE NOTE - CHIEF COMPLAINT QUOTE
low back pains radiated to right groin and right leg low back pains radiated to right groin and right leg  pt had completed 3 epidurals for back pains and had mri back and told not bad and does not need surgery  has appointment with ortho 3 weeks but cant wait

## 2021-07-29 NOTE — ED PROVIDER NOTE - OBJECTIVE STATEMENT
61 year old female with history of spinal stenosis and osteoarthritis presents with right low back pain, groin, and hip pain for the past few months. no injury or trauma. worse with movement. no loss of bowel or bladder control. no numbness to groin. no fevers or history of IV drug use. had bilateral knee replacement 4 years ago ([facility])  had MRI of back in Feb 2021 (ordered by Dr. Robertson) which showed herniated disks. referred to spinal specialist ([facility]). Was told "not bad enough for surgery". was referred to pain management (Dr. Diop). had 2 epidural injections in April without improvement. has been on multiple medications (oxycodone, gabapentin, motrin, mobic) without improvement. continues to have pain and "thinks it may be due to her hips". requesting hip x-rays. has appt with Dr. Carballo August 24th but did not want to wait that long. pain severe in nature   [facility]

## 2021-07-29 NOTE — ED PROVIDER NOTE - PATIENT PORTAL LINK FT
You can access the FollowMyHealth Patient Portal offered by  by registering at the following website: [email or web address]. By joining [facility] FollowMyHealth portal, you will also be able to view your health information using other applications (apps) compatible with our system.

## 2021-07-29 NOTE — ED PROVIDER NOTE - CLINICAL SUMMARY MEDICAL DECISION MAKING FREE TEXT BOX
right low back pain, right hip pain, right groin pain for months. has seen [facility] orthopedics and pain management for pain. requesting hip x-rays. will x-ray to eval for fx or arthritis. medrol dose pack. no loss of bowel or bladder control. no numbness to groin. ambulatory. no evidence of cord compression or cauda equina syndrome. no red flags. ortho follow up

## 2021-07-29 NOTE — ED PROVIDER NOTE - NSFOLLOWUPINSTRUCTIONS_ED_ALL_ED_FT
ice or moist heat  SalanPas lidocaine patches over the counter, 12 hours on 12 hours off  medrol dose pack next week, start tomorrow  have close follow up with orthopedics      Hip Pain    WHAT YOU NEED TO KNOW:    Hip pain can be caused by a number of conditions, such as bursitis, arthritis, or muscle or tendon strain. You may have swelling in the fluid-filled sacs that protect your muscles and tendons. Hip pain can also be caused by a lower back problem. Hip pain may be caused by trauma, playing sports, or running. Pain may start in your hip and go to your thigh, buttock, or groin.    Hip and Pelvis         DISCHARGE INSTRUCTIONS:    Medicines:   •NSAIDs, such as ibuprofen, help decrease swelling, pain, and fever. This medicine is available with or without a doctor's order. NSAIDs can cause stomach bleeding or kidney problems in certain people. If you take blood thinner medicine, always ask your healthcare provider if NSAIDs are safe for you. Always read the medicine label and follow directions.      •Take your medicine as directed. Contact your healthcare provider if you think your medicine is not helping or if you have side effects. Tell him of her if you are allergic to any medicine. Keep a list of the medicines, vitamins, and herbs you take. Include the amounts, and when and why you take them. Bring the list or the pill bottles to follow-up visits. Carry your medicine list with you in case of an emergency.      Return to the emergency department if:   •Your pain gets worse.      •You have numbness in your leg or toes.      •You cannot put any weight on or move your hip.      Contact your healthcare provider if:   •You have a fever.      •Your pain does not decrease, even after treatment.      •You have questions or concerns about your condition or care.      Follow up with your healthcare provider as directed: You may need physical therapy, an injection, or more testing. You may need to see an orthopedic specialist. Write down your questions so you remember to ask them during your visits.    Manage your hip pain:   •Rest your injured hip so that it can heal. You may need to avoid putting any weight on your hip for at least 48 hours. Return to normal activities as directed.      •Ice the injury for 20 minutes every 4 hours, or as directed. Use an ice pack, or put crushed ice in a plastic bag. Cover it with a towel to protect your skin. Ice helps prevent tissue damage and decreases swelling and pain.      •Elevate your injured hip above the level of your heart as often as you can. This will help decrease swelling and pain. If possible, prop your hip and leg on pillows or blankets to keep the area elevated comfortably.       •Maintain a healthy weight. Extra body weight can cause pressure and pain in your hip, knee, and ankle joints. Ask your healthcare provider how much you should weigh. Ask him or her to help you create a weight loss plan if you are overweight.      •Use assistive devices as directed. You may need to use a cane or crutches. Assistive devices help decrease pain and pressure on your hip when you walk. Ask your healthcare provider for more information about assistive devices and how to use them correctly.

## 2021-07-29 NOTE — ED PROVIDER NOTE - PROGRESS NOTE DETAILS
See [facility] for ED attending, Dr. Cantu: 62 y/o F w/ PMHx of spinal stenosis of lumbar region, and osteoarthritis, s/p double knee replacement w/ Dr. Carballo in  presents to ED c/o back and groin pain. Pt had MRI ordered Dr. Robertson in  and decided to start pain management. Pt had 2 epidurals in April. Pt is currently on various pain medication w/o relief. Pt had previous doppler on legs. Pt states she cannot lift legs. PCP: . Spine: Dr. Reveles. On exam vital signs normal, no distress, no spinal TTP, full range of motion intact, b/l knee surgical scars, hips and knees w/o TTP or deformity, full range of motion, pulses and sensation intact, gait normal. Impression is R lower back pain likely sciatica. Pt is requesting R hip XR to r/o hip pathology. Plan XR and consider steroids and ortho f/u. Reevaluated patient at bedside.  ambulatory with steady gait. Discussed the results of all diagnostic testing in ED.  will follow up with ortho. An opportunity to ask questions was given.  Discussed the importance of prompt, close medical follow-up.  Patient will return with any changes, concerns or persistent / worsening symptoms.  Understanding of all instructions verbalized. Reevaluated patient at bedside.  ambulatory with steady gait. Discussed the results of all diagnostic testing in ED. sig arthritis. will follow up with ortho. An opportunity to ask questions was given.  Discussed the importance of prompt, close medical follow-up.  Patient will return with any changes, concerns or persistent / worsening symptoms.  Understanding of all instructions verbalized. See [facility] for ED attending, Dr. Cantu: 60 y/o F w/ PMHx of spinal stenosis of lumbar region, and osteoarthritis, s/p double knee replacement w/ Dr. Carballo in  presents to ED c/o back and groin pain. Pt had MRI ordered Dr. Robertson in  and decided to start pain management. Pt had 2 epidurals in April. Pt is currently on various pain medication w/o relief. Pt had previous doppler on legs. Pt states she cannot lift legs. PCP: . Spine: Dr. Reveles. On exam vital signs normal, no distress, no spinal TTP, full range of motion intact, b/l knee surgical scars, hips and knees w/o TTP or deformity, full range of motion, pulses and sensation intact, gait normal. Impression is R lower back pain likely sciatica vs hip arthritis. Pt is requesting R hip XR to r/o hip pathology. Plan XR and consider steroids and ortho f/u.

## 2021-07-29 NOTE — ED PROVIDER NOTE - NEUROLOGICAL, MLM
Alert and oriented, no focal deficits, no motor or sensory deficits. no toe or foot drop. ambulatory with steady gait

## 2021-07-29 NOTE — ED ADULT NURSE NOTE - OBJECTIVE STATEMENT
Patient presents c/o worsening chronic back/groin pain. Patient has had right sciatica for several months and has been seen by ortho Dr. Robertson, spine Dr. Lama, pain management Dr Chikis. MRI done 2/21 shows bulging discs which patient was told are not surgical so was sent to Pain management and had 3 epidural injections. Patient has been prescribed Meloxicam, Gabapentin and oxycodone but states she threw them all away because they are ineffective and her pain persists and has worsened with difficulty walking due to the groin pain.. The pain is right low back and radiates to right groin and down the right leg. Patient presents c/o worsening chronic back/groin pain. Patient has had right sciatica for several months and has been seen by ortho Dr. Robertson, spine Dr. Lama, pain management Dr Chikis. MRI done 2/21 shows bulging discs which patient was told are not surgical so was sent to Pain management and had 3 epidural injections. Patient has been prescribed Meloxicam, Gabapentin and oxycodone but states she threw them all away because they are ineffective and her pain persists and has worsened with difficulty walking due to the groin pain.. The pain is right low back and radiates to right groin/hip and down the right leg. Patient requests x-ray of her hip and groin.

## 2021-07-29 NOTE — ED ADULT NURSE NOTE - CHIEF COMPLAINT QUOTE
low back pains radiated to right groin and right leg  pt had completed 3 epidurals for back pains and had mri back and told not bad and does not need surgery  has appointment with ortho 3 weeks but cant wait

## 2021-07-29 NOTE — ED ADULT NURSE NOTE - AS SC BRADEN MOISTURE
vitals as per anesthesia record  perioperative course uneventful  no obvious anesthesia related issues (4) rarely moist

## 2021-07-29 NOTE — ED PROVIDER NOTE - CHIEF COMPLAINT
[facility]  Phone: [phone]   The patient is a 61y Female complaining of back pain general.

## 2021-07-29 NOTE — ED PROVIDER NOTE - CARE PROVIDER_API CALL
Maury Carballo)  Orthopaedic Surgery  [address], [address]  [place] [address]  Phone: [phone]  Fax: [phone]  Follow Up Time: 1-3 Days

## 2021-07-29 NOTE — ED ADULT NURSE NOTE - PSH
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

## 2021-08-24 ENCOUNTER — NON-APPOINTMENT (OUTPATIENT)
Age: 61
End: 2021-08-24

## 2021-08-24 ENCOUNTER — APPOINTMENT (OUTPATIENT)
Dept: ORTHOPEDIC SURGERY | Facility: CLINIC | Age: 61
End: 2021-08-24
Payer: COMMERCIAL

## 2021-08-24 VITALS
HEIGHT: 68 IN | SYSTOLIC BLOOD PRESSURE: 136 MMHG | BODY MASS INDEX: 33.69 KG/M2 | DIASTOLIC BLOOD PRESSURE: 82 MMHG | WEIGHT: 222.3 LBS

## 2021-08-24 DIAGNOSIS — M16.11 UNILATERAL PRIMARY OSTEOARTHRITIS, RIGHT HIP: ICD-10-CM

## 2021-08-24 PROCEDURE — 99214 OFFICE O/P EST MOD 30 MIN: CPT

## 2021-08-24 PROCEDURE — 73502 X-RAY EXAM HIP UNI 2-3 VIEWS: CPT | Mod: RT

## 2021-09-14 ENCOUNTER — OUTPATIENT (OUTPATIENT)
Dept: OUTPATIENT SERVICES | Facility: HOSPITAL | Age: 61
LOS: 1 days | End: 2021-09-14
Payer: COMMERCIAL

## 2021-09-14 VITALS
RESPIRATION RATE: 16 BRPM | HEART RATE: 76 BPM | HEIGHT: 65.5 IN | DIASTOLIC BLOOD PRESSURE: 66 MMHG | WEIGHT: 220.02 LBS | OXYGEN SATURATION: 98 % | SYSTOLIC BLOOD PRESSURE: 123 MMHG | TEMPERATURE: 98 F

## 2021-09-14 DIAGNOSIS — Z98.891 HISTORY OF UTERINE SCAR FROM PREVIOUS SURGERY: Chronic | ICD-10-CM

## 2021-09-14 DIAGNOSIS — Z96.659 PRESENCE OF UNSPECIFIED ARTIFICIAL KNEE JOINT: Chronic | ICD-10-CM

## 2021-09-14 DIAGNOSIS — K11.9 DISEASE OF SALIVARY GLAND, UNSPECIFIED: Chronic | ICD-10-CM

## 2021-09-14 DIAGNOSIS — Z01.818 ENCOUNTER FOR OTHER PREPROCEDURAL EXAMINATION: ICD-10-CM

## 2021-09-14 DIAGNOSIS — Z98.890 OTHER SPECIFIED POSTPROCEDURAL STATES: Chronic | ICD-10-CM

## 2021-09-14 DIAGNOSIS — M16.11 UNILATERAL PRIMARY OSTEOARTHRITIS, RIGHT HIP: ICD-10-CM

## 2021-09-14 LAB
A1C WITH ESTIMATED AVERAGE GLUCOSE RESULT: 6.1 % — HIGH (ref 4–5.6)
ALBUMIN SERPL ELPH-MCNC: 3.6 G/DL — SIGNIFICANT CHANGE UP (ref 3.3–5)
ALP SERPL-CCNC: 80 U/L — SIGNIFICANT CHANGE UP (ref 30–120)
ALT FLD-CCNC: 23 U/L DA — SIGNIFICANT CHANGE UP (ref 10–60)
ANION GAP SERPL CALC-SCNC: 7 MMOL/L — SIGNIFICANT CHANGE UP (ref 5–17)
APTT BLD: 31.4 SEC — SIGNIFICANT CHANGE UP (ref 27.5–35.5)
AST SERPL-CCNC: 9 U/L — LOW (ref 10–40)
BILIRUB SERPL-MCNC: 0.3 MG/DL — SIGNIFICANT CHANGE UP (ref 0.2–1.2)
BLD GP AB SCN SERPL QL: SIGNIFICANT CHANGE UP
BUN SERPL-MCNC: 17 MG/DL — SIGNIFICANT CHANGE UP (ref 7–23)
CALCIUM SERPL-MCNC: 9.1 MG/DL — SIGNIFICANT CHANGE UP (ref 8.4–10.5)
CHLORIDE SERPL-SCNC: 104 MMOL/L — SIGNIFICANT CHANGE UP (ref 96–108)
CO2 SERPL-SCNC: 27 MMOL/L — SIGNIFICANT CHANGE UP (ref 22–31)
CREAT SERPL-MCNC: 0.53 MG/DL — SIGNIFICANT CHANGE UP (ref 0.5–1.3)
ESTIMATED AVERAGE GLUCOSE: 128 MG/DL — HIGH (ref 68–114)
GLUCOSE SERPL-MCNC: 99 MG/DL — SIGNIFICANT CHANGE UP (ref 70–99)
HCT VFR BLD CALC: 41.4 % — SIGNIFICANT CHANGE UP (ref 34.5–45)
HGB BLD-MCNC: 13.7 G/DL — SIGNIFICANT CHANGE UP (ref 11.5–15.5)
INR BLD: 1.01 RATIO — SIGNIFICANT CHANGE UP (ref 0.88–1.16)
MCHC RBC-ENTMCNC: 29.7 PG — SIGNIFICANT CHANGE UP (ref 27–34)
MCHC RBC-ENTMCNC: 33.1 GM/DL — SIGNIFICANT CHANGE UP (ref 32–36)
MCV RBC AUTO: 89.8 FL — SIGNIFICANT CHANGE UP (ref 80–100)
MRSA PCR RESULT.: SIGNIFICANT CHANGE UP
NRBC # BLD: 0 /100 WBCS — SIGNIFICANT CHANGE UP (ref 0–0)
PLATELET # BLD AUTO: 225 K/UL — SIGNIFICANT CHANGE UP (ref 150–400)
POTASSIUM SERPL-MCNC: 4 MMOL/L — SIGNIFICANT CHANGE UP (ref 3.5–5.3)
POTASSIUM SERPL-SCNC: 4 MMOL/L — SIGNIFICANT CHANGE UP (ref 3.5–5.3)
PROT SERPL-MCNC: 7.3 G/DL — SIGNIFICANT CHANGE UP (ref 6–8.3)
PROTHROM AB SERPL-ACNC: 12.2 SEC — SIGNIFICANT CHANGE UP (ref 10.6–13.6)
RBC # BLD: 4.61 M/UL — SIGNIFICANT CHANGE UP (ref 3.8–5.2)
RBC # FLD: 14.3 % — SIGNIFICANT CHANGE UP (ref 10.3–14.5)
S AUREUS DNA NOSE QL NAA+PROBE: SIGNIFICANT CHANGE UP
SODIUM SERPL-SCNC: 138 MMOL/L — SIGNIFICANT CHANGE UP (ref 135–145)
WBC # BLD: 9.32 K/UL — SIGNIFICANT CHANGE UP (ref 3.8–10.5)
WBC # FLD AUTO: 9.32 K/UL — SIGNIFICANT CHANGE UP (ref 3.8–10.5)

## 2021-09-14 PROCEDURE — 85610 PROTHROMBIN TIME: CPT

## 2021-09-14 PROCEDURE — 85730 THROMBOPLASTIN TIME PARTIAL: CPT

## 2021-09-14 PROCEDURE — 86901 BLOOD TYPING SEROLOGIC RH(D): CPT

## 2021-09-14 PROCEDURE — 93010 ELECTROCARDIOGRAM REPORT: CPT

## 2021-09-14 PROCEDURE — 85027 COMPLETE CBC AUTOMATED: CPT

## 2021-09-14 PROCEDURE — 86900 BLOOD TYPING SEROLOGIC ABO: CPT

## 2021-09-14 PROCEDURE — 86850 RBC ANTIBODY SCREEN: CPT

## 2021-09-14 PROCEDURE — 36415 COLL VENOUS BLD VENIPUNCTURE: CPT

## 2021-09-14 PROCEDURE — 83036 HEMOGLOBIN GLYCOSYLATED A1C: CPT

## 2021-09-14 PROCEDURE — 80053 COMPREHEN METABOLIC PANEL: CPT

## 2021-09-14 PROCEDURE — 87640 STAPH A DNA AMP PROBE: CPT

## 2021-09-14 PROCEDURE — G0463: CPT

## 2021-09-14 PROCEDURE — 87641 MR-STAPH DNA AMP PROBE: CPT

## 2021-09-14 PROCEDURE — 93005 ELECTROCARDIOGRAM TRACING: CPT

## 2021-09-14 NOTE — H&P PST ADULT - NSICDXPASTMEDICALHX_GEN_ALL_CORE_FT
PAST MEDICAL HISTORY:  COVID-19 vaccine series completed     H/O varicose vein ligation B/L LE    OA (osteoarthritis) of hip right    Obesity (BMI 30.0-34.9)     Osteoarthritis of both knees     Severe obesity with body mass index (BMI) of 36.0 to 36.9 with serious comorbidity     Smoker 25PPY    Spinal stenosis of lumbar region

## 2021-09-14 NOTE — H&P PST ADULT - HISTORY OF PRESENT ILLNESS
62 y/o female with right hip pain for many years./ C/o stiffness and pain with weight bearing. Failed conservative therapy and was advised surgery. Patient for PST in NAD

## 2021-09-14 NOTE — H&P PST ADULT - ASSESSMENT
62 y/o female with right hip pain  Planned surgery.- right hip replacement 10/6-awaiting new booking sheet  Will obtain medical clearance  covid testing 10/4/  Pre op instructions provided  Instructions provided on medications to continue and to take the day morning of surgery

## 2021-09-14 NOTE — H&P PST ADULT - NSICDXPASTSURGICALHX_GEN_ALL_CORE_FT
PAST SURGICAL HISTORY:  Benign mass of parotid gland 20 years ago    H/O:  28 years ago    History of myomectomy 10 years ago    History of total knee replacement bilateral 2017

## 2021-09-14 NOTE — H&P PST ADULT - NSICDXFAMILYHX_GEN_ALL_CORE_FT
FAMILY HISTORY:  Father  Still living? No  Family history of heart disease, Age at diagnosis: Age Unknown    Mother  Still living? No  Family history of diabetes mellitus, Age at diagnosis: Age Unknown

## 2021-10-04 ENCOUNTER — OUTPATIENT (OUTPATIENT)
Dept: OUTPATIENT SERVICES | Facility: HOSPITAL | Age: 61
LOS: 1 days | End: 2021-10-04
Payer: COMMERCIAL

## 2021-10-04 DIAGNOSIS — Z96.659 PRESENCE OF UNSPECIFIED ARTIFICIAL KNEE JOINT: Chronic | ICD-10-CM

## 2021-10-04 DIAGNOSIS — Z20.828 CONTACT WITH AND (SUSPECTED) EXPOSURE TO OTHER VIRAL COMMUNICABLE DISEASES: ICD-10-CM

## 2021-10-04 DIAGNOSIS — Z98.891 HISTORY OF UTERINE SCAR FROM PREVIOUS SURGERY: Chronic | ICD-10-CM

## 2021-10-04 DIAGNOSIS — K11.9 DISEASE OF SALIVARY GLAND, UNSPECIFIED: Chronic | ICD-10-CM

## 2021-10-04 DIAGNOSIS — Z98.890 OTHER SPECIFIED POSTPROCEDURAL STATES: Chronic | ICD-10-CM

## 2021-10-04 LAB — SARS-COV-2 RNA SPEC QL NAA+PROBE: SIGNIFICANT CHANGE UP

## 2021-10-04 PROCEDURE — U0003: CPT

## 2021-10-04 PROCEDURE — U0005: CPT

## 2021-10-06 ENCOUNTER — TRANSCRIPTION ENCOUNTER (OUTPATIENT)
Age: 61
End: 2021-10-06

## 2021-10-06 ENCOUNTER — APPOINTMENT (OUTPATIENT)
Dept: ORTHOPEDIC SURGERY | Facility: HOSPITAL | Age: 61
End: 2021-10-06

## 2021-10-06 ENCOUNTER — INPATIENT (INPATIENT)
Facility: HOSPITAL | Age: 61
LOS: 0 days | Discharge: HOME | DRG: 470 | End: 2021-10-07
Attending: ORTHOPAEDIC SURGERY | Admitting: ORTHOPAEDIC SURGERY
Payer: COMMERCIAL

## 2021-10-06 ENCOUNTER — RESULT REVIEW (OUTPATIENT)
Age: 61
End: 2021-10-06

## 2021-10-06 VITALS
RESPIRATION RATE: 15 BRPM | SYSTOLIC BLOOD PRESSURE: 129 MMHG | DIASTOLIC BLOOD PRESSURE: 65 MMHG | WEIGHT: 216.49 LBS | HEIGHT: 67 IN | OXYGEN SATURATION: 100 % | HEART RATE: 65 BPM | TEMPERATURE: 98 F

## 2021-10-06 DIAGNOSIS — M16.11 UNILATERAL PRIMARY OSTEOARTHRITIS, RIGHT HIP: ICD-10-CM

## 2021-10-06 DIAGNOSIS — K11.9 DISEASE OF SALIVARY GLAND, UNSPECIFIED: Chronic | ICD-10-CM

## 2021-10-06 DIAGNOSIS — Z98.890 OTHER SPECIFIED POSTPROCEDURAL STATES: Chronic | ICD-10-CM

## 2021-10-06 DIAGNOSIS — Z96.659 PRESENCE OF UNSPECIFIED ARTIFICIAL KNEE JOINT: Chronic | ICD-10-CM

## 2021-10-06 DIAGNOSIS — Z98.891 HISTORY OF UTERINE SCAR FROM PREVIOUS SURGERY: Chronic | ICD-10-CM

## 2021-10-06 PROBLEM — M16.9 OSTEOARTHRITIS OF HIP, UNSPECIFIED: Chronic | Status: ACTIVE | Noted: 2021-09-14

## 2021-10-06 PROBLEM — F17.200 NICOTINE DEPENDENCE, UNSPECIFIED, UNCOMPLICATED: Chronic | Status: ACTIVE | Noted: 2017-02-08

## 2021-10-06 PROBLEM — E66.01 MORBID (SEVERE) OBESITY DUE TO EXCESS CALORIES: Chronic | Status: ACTIVE | Noted: 2021-09-14

## 2021-10-06 PROBLEM — Z92.29 PERSONAL HISTORY OF OTHER DRUG THERAPY: Chronic | Status: ACTIVE | Noted: 2021-09-14

## 2021-10-06 PROCEDURE — 27130 TOTAL HIP ARTHROPLASTY: CPT | Mod: RT

## 2021-10-06 PROCEDURE — 88311 DECALCIFY TISSUE: CPT | Mod: 26

## 2021-10-06 PROCEDURE — 99222 1ST HOSP IP/OBS MODERATE 55: CPT | Mod: 25

## 2021-10-06 PROCEDURE — 73501 X-RAY EXAM HIP UNI 1 VIEW: CPT | Mod: 26,RT

## 2021-10-06 PROCEDURE — 88305 TISSUE EXAM BY PATHOLOGIST: CPT | Mod: 26

## 2021-10-06 RX ORDER — TRANEXAMIC ACID 100 MG/ML
1000 INJECTION, SOLUTION INTRAVENOUS ONCE
Refills: 0 | Status: COMPLETED | OUTPATIENT
Start: 2021-10-06 | End: 2021-10-06

## 2021-10-06 RX ORDER — SENNA PLUS 8.6 MG/1
2 TABLET ORAL AT BEDTIME
Refills: 0 | Status: DISCONTINUED | OUTPATIENT
Start: 2021-10-06 | End: 2021-10-07

## 2021-10-06 RX ORDER — ACETAMINOPHEN 500 MG
2 TABLET ORAL
Qty: 0 | Refills: 0 | DISCHARGE
Start: 2021-10-06 | End: 2021-10-20

## 2021-10-06 RX ORDER — OXYCODONE HYDROCHLORIDE 5 MG/1
10 TABLET ORAL
Refills: 0 | Status: DISCONTINUED | OUTPATIENT
Start: 2021-10-06 | End: 2021-10-07

## 2021-10-06 RX ORDER — HYDROMORPHONE HYDROCHLORIDE 2 MG/ML
0.5 INJECTION INTRAMUSCULAR; INTRAVENOUS; SUBCUTANEOUS
Refills: 0 | Status: DISCONTINUED | OUTPATIENT
Start: 2021-10-06 | End: 2021-10-07

## 2021-10-06 RX ORDER — ACETAMINOPHEN 500 MG
1000 TABLET ORAL EVERY 8 HOURS
Refills: 0 | Status: DISCONTINUED | OUTPATIENT
Start: 2021-10-07 | End: 2021-10-07

## 2021-10-06 RX ORDER — CEFAZOLIN SODIUM 1 G
2000 VIAL (EA) INJECTION ONCE
Refills: 0 | Status: COMPLETED | OUTPATIENT
Start: 2021-10-06 | End: 2021-10-06

## 2021-10-06 RX ORDER — POLYETHYLENE GLYCOL 3350 17 G/17G
17 POWDER, FOR SOLUTION ORAL AT BEDTIME
Refills: 0 | Status: DISCONTINUED | OUTPATIENT
Start: 2021-10-06 | End: 2021-10-07

## 2021-10-06 RX ORDER — ONDANSETRON 8 MG/1
4 TABLET, FILM COATED ORAL EVERY 6 HOURS
Refills: 0 | Status: DISCONTINUED | OUTPATIENT
Start: 2021-10-06 | End: 2021-10-07

## 2021-10-06 RX ORDER — HYDROMORPHONE HYDROCHLORIDE 2 MG/ML
0.5 INJECTION INTRAMUSCULAR; INTRAVENOUS; SUBCUTANEOUS
Refills: 0 | Status: DISCONTINUED | OUTPATIENT
Start: 2021-10-06 | End: 2021-10-06

## 2021-10-06 RX ORDER — SODIUM CHLORIDE 9 MG/ML
1000 INJECTION, SOLUTION INTRAVENOUS
Refills: 0 | Status: DISCONTINUED | OUTPATIENT
Start: 2021-10-06 | End: 2021-10-07

## 2021-10-06 RX ORDER — CELECOXIB 200 MG/1
1 CAPSULE ORAL
Qty: 60 | Refills: 0
Start: 2021-10-06 | End: 2021-11-04

## 2021-10-06 RX ORDER — SODIUM CHLORIDE 9 MG/ML
1000 INJECTION, SOLUTION INTRAVENOUS
Refills: 0 | Status: DISCONTINUED | OUTPATIENT
Start: 2021-10-06 | End: 2021-10-06

## 2021-10-06 RX ORDER — SENNA PLUS 8.6 MG/1
2 TABLET ORAL
Qty: 0 | Refills: 0 | DISCHARGE
Start: 2021-10-06

## 2021-10-06 RX ORDER — OXYCODONE HYDROCHLORIDE 5 MG/1
5 TABLET ORAL
Refills: 0 | Status: DISCONTINUED | OUTPATIENT
Start: 2021-10-06 | End: 2021-10-07

## 2021-10-06 RX ORDER — CEFAZOLIN SODIUM 1 G
2000 VIAL (EA) INJECTION EVERY 8 HOURS
Refills: 0 | Status: COMPLETED | OUTPATIENT
Start: 2021-10-06 | End: 2021-10-07

## 2021-10-06 RX ORDER — SODIUM CHLORIDE 9 MG/ML
500 INJECTION INTRAMUSCULAR; INTRAVENOUS; SUBCUTANEOUS ONCE
Refills: 0 | Status: COMPLETED | OUTPATIENT
Start: 2021-10-06 | End: 2021-10-06

## 2021-10-06 RX ORDER — ASPIRIN/CALCIUM CARB/MAGNESIUM 324 MG
81 TABLET ORAL EVERY 12 HOURS
Refills: 0 | Status: DISCONTINUED | OUTPATIENT
Start: 2021-10-07 | End: 2021-10-07

## 2021-10-06 RX ORDER — POLYETHYLENE GLYCOL 3350 17 G/17G
17 POWDER, FOR SOLUTION ORAL
Qty: 0 | Refills: 0 | DISCHARGE
Start: 2021-10-06

## 2021-10-06 RX ORDER — CHLORHEXIDINE GLUCONATE 213 G/1000ML
1 SOLUTION TOPICAL ONCE
Refills: 0 | Status: COMPLETED | OUTPATIENT
Start: 2021-10-06 | End: 2021-10-06

## 2021-10-06 RX ORDER — OMEPRAZOLE 10 MG/1
1 CAPSULE, DELAYED RELEASE ORAL
Qty: 30 | Refills: 1
Start: 2021-10-06 | End: 2021-12-04

## 2021-10-06 RX ORDER — OXYCODONE HYDROCHLORIDE 5 MG/1
5 TABLET ORAL ONCE
Refills: 0 | Status: DISCONTINUED | OUTPATIENT
Start: 2021-10-06 | End: 2021-10-06

## 2021-10-06 RX ORDER — ACETAMINOPHEN 500 MG
1000 TABLET ORAL ONCE
Refills: 0 | Status: COMPLETED | OUTPATIENT
Start: 2021-10-07 | End: 2021-10-07

## 2021-10-06 RX ORDER — ASPIRIN/CALCIUM CARB/MAGNESIUM 324 MG
1 TABLET ORAL
Qty: 83 | Refills: 0
Start: 2021-10-06 | End: 2021-11-16

## 2021-10-06 RX ORDER — ACETAMINOPHEN 500 MG
1000 TABLET ORAL ONCE
Refills: 0 | Status: COMPLETED | OUTPATIENT
Start: 2021-10-06 | End: 2021-10-06

## 2021-10-06 RX ORDER — ONDANSETRON 8 MG/1
4 TABLET, FILM COATED ORAL ONCE
Refills: 0 | Status: DISCONTINUED | OUTPATIENT
Start: 2021-10-06 | End: 2021-10-06

## 2021-10-06 RX ORDER — PANTOPRAZOLE SODIUM 20 MG/1
40 TABLET, DELAYED RELEASE ORAL
Refills: 0 | Status: DISCONTINUED | OUTPATIENT
Start: 2021-10-06 | End: 2021-10-07

## 2021-10-06 RX ORDER — APREPITANT 80 MG/1
40 CAPSULE ORAL ONCE
Refills: 0 | Status: COMPLETED | OUTPATIENT
Start: 2021-10-06 | End: 2021-10-06

## 2021-10-06 RX ORDER — CELECOXIB 200 MG/1
200 CAPSULE ORAL EVERY 12 HOURS
Refills: 0 | Status: DISCONTINUED | OUTPATIENT
Start: 2021-10-07 | End: 2021-10-07

## 2021-10-06 RX ADMIN — APREPITANT 40 MILLIGRAM(S): 80 CAPSULE ORAL at 08:21

## 2021-10-06 RX ADMIN — OXYCODONE HYDROCHLORIDE 10 MILLIGRAM(S): 5 TABLET ORAL at 21:35

## 2021-10-06 RX ADMIN — SODIUM CHLORIDE 125 MILLILITER(S): 9 INJECTION, SOLUTION INTRAVENOUS at 18:26

## 2021-10-06 RX ADMIN — OXYCODONE HYDROCHLORIDE 10 MILLIGRAM(S): 5 TABLET ORAL at 22:10

## 2021-10-06 RX ADMIN — OXYCODONE HYDROCHLORIDE 5 MILLIGRAM(S): 5 TABLET ORAL at 19:00

## 2021-10-06 RX ADMIN — SODIUM CHLORIDE 500 MILLILITER(S): 9 INJECTION INTRAMUSCULAR; INTRAVENOUS; SUBCUTANEOUS at 22:46

## 2021-10-06 RX ADMIN — SODIUM CHLORIDE 75 MILLILITER(S): 9 INJECTION, SOLUTION INTRAVENOUS at 14:24

## 2021-10-06 RX ADMIN — OXYCODONE HYDROCHLORIDE 5 MILLIGRAM(S): 5 TABLET ORAL at 18:26

## 2021-10-06 RX ADMIN — CHLORHEXIDINE GLUCONATE 1 APPLICATION(S): 213 SOLUTION TOPICAL at 08:21

## 2021-10-06 RX ADMIN — SODIUM CHLORIDE 500 MILLILITER(S): 9 INJECTION INTRAMUSCULAR; INTRAVENOUS; SUBCUTANEOUS at 14:23

## 2021-10-06 RX ADMIN — Medication 400 MILLIGRAM(S): at 18:26

## 2021-10-06 RX ADMIN — Medication 1000 MILLIGRAM(S): at 19:20

## 2021-10-06 RX ADMIN — Medication 100 MILLIGRAM(S): at 18:55

## 2021-10-06 RX ADMIN — SODIUM CHLORIDE 125 MILLILITER(S): 9 INJECTION, SOLUTION INTRAVENOUS at 21:35

## 2021-10-06 NOTE — DISCHARGE NOTE PROVIDER - PROVIDER TOKENS
PROVIDER:[TOKEN:[[id number]],SCHEDULEDAPPT:[10/25/2021],SCHEDULEDAPPTTIME:[02:00 PM],ESTABLISHEDPATIENT:[T]]

## 2021-10-06 NOTE — DISCHARGE NOTE PROVIDER - NSDCMRMEDTOKEN_GEN_ALL_CORE_FT
acetaminophen 500 mg oral tablet: 2 tab(s) orally every 8 hours  aspirin 81 mg oral delayed release tablet: 1 tab orally every 12 hours. Take 2 hours before Celebrex.   celecoxib 200 mg oral capsule: 1 cap orally every 12 hours. Take 2 hours after Aspirin.  omeprazole 20 mg oral delayed release capsule: 1 cap orally once a day   polyethylene glycol 3350 oral powder for reconstitution: 17 gram(s) orally once a day (at bedtime)  senna oral tablet: 2 tab(s) orally once a day (at bedtime)  Tylenol 500 mg oral tablet: 2 tab(s) orally every 6 hours, As Needed   acetaminophen 500 mg oral tablet: 2 tab(s) orally every 8 hours  aspirin 81 mg oral delayed release tablet: 1 tab orally every 12 hours. Take 2 hours before Celebrex.   celecoxib 200 mg oral capsule: 1 cap orally every 12 hours. Take 2 hours after Aspirin.  omeprazole 20 mg oral delayed release capsule: 1 cap orally once a day   oxyCODONE 5 mg oral tablet: 1-2 tab(s) orally every 4 hours, As Needed -for severe pain MDD:6  polyethylene glycol 3350 oral powder for reconstitution: 17 gram(s) orally once a day (at bedtime)  senna oral tablet: 2 tab(s) orally once a day (at bedtime)

## 2021-10-06 NOTE — PRE-OP CHECKLIST - AS BP NONINV METHOD
61-year-old male, here for evaluation of a lesion on his ventral left forearm. There is been present for several months in the summertime, it was thicker darker and more prominent. It subsequently partially Timothy. He now notes a slightly flatter consider but he is concerned because his dad had a malignant melanoma. He thinks he has another back as well. The area on his back also has become slightly thicker, inflamed  and appears to change and sometimes becomes bulky. He is concerned again that this is a tumor or something suspicious because he cannot follow the area closely and again because of his father's history.     Review of systems: No atypical scarring.    He is cooperative alert and oriented    Skin type shows type II skin, hydrated, on the back there are only 2 seborrheic keratoses and what is very small. On the central mid back near the paraspinal area is approximate 1.2 x 1.2 cm seborrheic keratoses. Is fairly homogeneous and nothing else atypical. No other atypical pigmented lesions noted on the back. It addition, on the left ventral upper forearm is a keratoses as well. This is about 6 x 6 mm. It is fairly homogeneous color and well-defined.    Impression: Recurrently traumatized and irritated seborrheic keratoses of the upper back and left forearm. These do not have suspicion for skin cancer and do not do biopsy but they are acutely irritated and we will treat them to prevent further growth itching and change in size and fluctuation.    We gave for freeze and thaw cycles to each her keratoses. He understands the expectations of this treatment. He also understands about care. He will come back and see if his any new suspicious lesions as well as well as if there is any return of these lesions. Otherwise all questions answered.  
Estuardo White is a 61 year old male presenting for mole on arm.  Previous patient. Last seen 7/19/19    History:  Medications verified, no changes.  Denies known Latex allergy or symptoms of Latex sensitivity.  Allergies reviewed and updated.     Patient's PCP was verified at today's visit.  Social History     Tobacco Use   Smoking Status Former Smoker   • Types: Cigars   • Last attempt to quit: 6/1/2004   • Years since quitting: 15.4   Smokeless Tobacco Never Used                                     
electronic

## 2021-10-06 NOTE — DISCHARGE NOTE PROVIDER - CARE PROVIDER_API CALL
Maury Carballo)  Orthopedic Surgery  [address], [address]  [place] [address]  Phone: [phone]  Fax: [phone]  Established Patient  Scheduled Appointment: 10/25/2021 02:00 PM

## 2021-10-06 NOTE — PHYSICAL THERAPY INITIAL EVALUATION ADULT - GENERAL OBSERVATIONS, REHAB EVAL
Pt received supine in bed. All lines intact. Pt agreeable to physical therapy. +hip abductor pillow, +SCD's donned + IV

## 2021-10-06 NOTE — DISCHARGE NOTE PROVIDER - INSTRUCTIONS
Pain medicine has been prescribed for you, as needed, and it often causes constipation.  Take docusate sodium (Colace) 100mg 3x daily, while taking narcotic pain medication.   For Constipation :   • Increase your water intake. Drink at least 8 glasses of water daily.  • Try adding fiber to your diet by eating fruits, vegetables and foods that are rich in grains.  • If you do experience constipation, you may take an over-the-counter laxative such as, Senokot, Miralax or  Milk of Magnesia.

## 2021-10-06 NOTE — PHYSICAL THERAPY INITIAL EVALUATION ADULT - GAIT TRAINING, PT EVAL
Pt will ambulate with a RW for 150 feet independently in 1-2 days Pt will ambulate with a RW for 150 feet independently in 1-2 days. 12 steps with SAC/HR and supervision in 1-2 days

## 2021-10-06 NOTE — DISCHARGE NOTE PROVIDER - HOSPITAL COURSE
This patient was admitted to [facility] with a history of severe degenerative joint disease of the right hip.  Patient underwent Pre-Surgical Testing and was medically cleared to undergo elective procedure. Patient underwent right THR by Dr. Maury Carballo on 10/6/21. Procedure was well tolerated.  No operative or henny-operative complications arose during patient's hospital course.  Patient received antibiotic according to SCIP guidelines for infection prevention.  Aspirin 81mg q 12h was given for DVT prophylaxis, in addition to the use of SCDs.  Anesthesia, Medical Hospitalist, Physical Therapy and Occupational Therapy were consulted. Patient is stable for discharge with a good prognosis.  Appropriate discharge instructions and medications are provided in this document.

## 2021-10-06 NOTE — CONSULT NOTE ADULT - ASSESSMENT
60 yo f pmh OA is s/p Right THR    #S/P Right THR  Post op orders per ortho  Pain management  Bowl regimen  Pt eval  Obtain baseline labs      #DVT proph  per ortho

## 2021-10-06 NOTE — DISCHARGE NOTE PROVIDER - NSDCCPTREATMENT_GEN_ALL_CORE_FT
PRINCIPAL PROCEDURE  Procedure: Right total hip replacement  Findings and Treatment: Severe DJD right hip

## 2021-10-06 NOTE — BRIEF OPERATIVE NOTE - NSICDXBRIEFOPLAUNCH_GEN_ALL_CORE
chest discomfort, numbness, tingling/WEAKNESS
<--- Click to Launch ICDx for PreOp, PostOp and Procedure

## 2021-10-06 NOTE — DISCHARGE NOTE NURSING/CASE MANAGEMENT/SOCIAL WORK - NSSCNAMETXT_GEN_ALL_CORE
[facility] - [phone] or [phone]  PT to visit the day after hospital discharge; RN to follow if needed. Please contact the home care agency if you have not heard from them by 12 noon on the day after your hospital discharge.   [facility] (formerly [facility])   [address] [place] [address]

## 2021-10-06 NOTE — DISCHARGE NOTE NURSING/CASE MANAGEMENT/SOCIAL WORK - NSSCTYPOFSERV_GEN_ALL_CORE
For your PT services Physical Therapist to visit the day after hospital discharge; Registered Nurse to follow if there is a need. Please contact the home care agency at the above phone number if you have not heard from them by 12 noon on the day after your hospital discharge

## 2021-10-06 NOTE — CONSULT NOTE ADULT - SUBJECTIVE AND OBJECTIVE BOX
HPI:    62 yo f pmh OA is s/p Right THR.  Prior to the procedure, right hip pain for many years./ C/o stiffness and pain with weight bearing. Failed conservative therapy and was advised surgery.  Pt is currently looking forward to recovery.        REVIEW OF SYSTEMS:  CONSTITUTIONAL: No fever, weight loss, or fatigue  RESPIRATORY: No cough, wheezing, chills or hemoptysis; No shortness of breath  CARDIOVASCULAR: No chest pain, palpitations, dizziness, or leg swelling  GASTROINTESTINAL: No abdominal or epigastric pain. No nausea, vomiting, or hematemesis; No diarrhea or constipation. No melena or hematochezia.  GENITOURINARY: No dysuria, frequency, hematuria, or incontinence  NEUROLOGICAL: No headaches, memory loss, loss of strength, numbness, or tremors  MUSCULOSKELETAL: No muscle or back pain  PSYCHIATRIC: No depression, anxiety, mood swings, or difficulty sleeping      PAST MEDICAL & SURGICAL HISTORY:  Osteoarthritis of both knees    Spinal stenosis of lumbar region    Smoker  25PPY    Obesity (BMI 30.0-34.9)    OA (osteoarthritis) of hip  right    COVID-19 vaccine series completed    Severe obesity with body mass index (BMI) of 36.0 to 36.9 with serious comorbidity    H/O varicose vein ligation  B/L LE    H/O:   28 years ago    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 1000 milliGRAM(s) IV Intermittent once  lactated ringers. 1000 milliLiter(s) (75 mL/Hr) IV Continuous <Continuous>    MEDICATIONS  (PRN):  HYDROmorphone  Injectable 0.5 milliGRAM(s) IV Push every 10 minutes PRN Moderate Pain (4 - 6)  ondansetron Injectable 4 milliGRAM(s) IV Push once PRN Nausea and/or Vomiting  oxyCODONE    IR 5 milliGRAM(s) Oral once PRN Mild Pain (1 - 3)      FAMILY HISTORY:  Family history of heart disease (Father)    Family history of diabetes mellitus (Mother)        Vital Signs Last 24 Hrs  T(C): 36.3 (06 Oct 2021 13:09), Max: 36.7 (06 Oct 2021 08:16)  T(F): 97.4 (06 Oct 2021 13:09), Max: 98 (06 Oct 2021 08:16)  HR: 67 (06 Oct 2021 14:32) (53 - 76)  BP: 100/54 (06 Oct 2021 14:32) (87/43 - 129/65)  BP(mean): --  RR: 18 (06 Oct 2021 14:32) (11 - 20)  SpO2: 94% (06 Oct 2021 14:32) (94% - 100%)    PHYSICAL EXAM:    GENERAL: NAD, well-groomed, well-developed  HEAD:  Atraumatic, Normocephalic  EYES: EOMI, PERRLA, conjunctiva and sclera clear  ENMT: No tonsillar erythema, exudates, or enlargement; Moist mucous membranes, Good dentition, No lesions  NECK: Supple, No JVD, Normal thyroid  NERVOUS SYSTEM:  Alert & Oriented X3, Good concentration; Moving all 4 extremities; No gross sensory deficits  CHEST/LUNG: Clear to auscultation bilaterally; No rales, rhonchi, wheezing, or rubs  HEART: Regular rate and rhythm; No murmurs, rubs, or gallops  ABDOMEN: Soft, Nontender, Nondistended; Bowel sounds present  EXTREMITIES:  2+ Peripheral Pulses, No clubbing, cyanosis, or edema  SKIN: No rashes or lesions  INCISION: intact    Labs: Pending

## 2021-10-06 NOTE — DISCHARGE NOTE PROVIDER - NSDCFUSCHEDAPPT_GEN_ALL_CORE_FT
JOSE DANIEL MURILLO ; 10/25/2021 ; [address]  JOSE DANIEL MURILLO ; 12/07/2021 ; [address]

## 2021-10-06 NOTE — DISCHARGE NOTE NURSING/CASE MANAGEMENT/SOCIAL WORK - PATIENT PORTAL LINK FT
You can access the FollowMyHealth Patient Portal offered by [facility] by registering at the following website: [email or web address]. By joining [facility]’s FollowMyHealth portal, you will also be able to view your health information using other applications (apps) compatible with our system.

## 2021-10-06 NOTE — OCCUPATIONAL THERAPY INITIAL EVALUATION ADULT - LIVES WITH, PROFILE
Pt lives with her  in a private home, 1 step to enter through the garage, 15 steps to 2nd floor bedroom with a walk in shower. Pt was independent with ADLs, IADLs, functional mobility/transfers, driving prior to admission./spouse

## 2021-10-06 NOTE — DISCHARGE NOTE PROVIDER - NSDCCPCAREPLAN_GEN_ALL_CORE_FT
PRINCIPAL DISCHARGE DIAGNOSIS  Diagnosis: Primary osteoarthritis of right hip  Assessment and Plan of Treatment: Physical Therapy /Occupational Therapy  Total Hip Protocol   - Ambulation  - Transfers   - Stairs   - ADLs (activities of daily living)  Posterior Total Hip Replacement precautions for 4 weeks  - Abduction pillow   - High hip chair for sitting  - No internal rotation,   - No crossing legs   - No sleeping on opposite sides  • Ice 20 minutes several times daily with at least a 20 minute break in between icing sessions  You have a MESERET dressing. You may shower. Disconnect MESERET battery prior to showering. Reconnect battery after showering and press orange button to resume MESERET power. Remove MESERET dressing on post-op day #7.  Keep incision clean. DO NOT APPLY ANYTHING to incision site (salves/ointments/creams). Do not scrub incision site. Pat dry after shower.  Suture removal 2 weeks after surgery at Surgeon's office.

## 2021-10-06 NOTE — PHYSICAL THERAPY INITIAL EVALUATION ADULT - ADDITIONAL COMMENTS
Pt lives in private home with 1 GUILLERMO and 1 flight inside home +HR. Pt owns RW and SAC from previous TKR's

## 2021-10-07 VITALS
HEART RATE: 50 BPM | TEMPERATURE: 98 F | DIASTOLIC BLOOD PRESSURE: 69 MMHG | OXYGEN SATURATION: 98 % | RESPIRATION RATE: 17 BRPM | SYSTOLIC BLOOD PRESSURE: 108 MMHG

## 2021-10-07 LAB
ANION GAP SERPL CALC-SCNC: 8 MMOL/L — SIGNIFICANT CHANGE UP (ref 5–17)
BUN SERPL-MCNC: 12 MG/DL — SIGNIFICANT CHANGE UP (ref 7–23)
CALCIUM SERPL-MCNC: 8.7 MG/DL — SIGNIFICANT CHANGE UP (ref 8.4–10.5)
CHLORIDE SERPL-SCNC: 107 MMOL/L — SIGNIFICANT CHANGE UP (ref 96–108)
CO2 SERPL-SCNC: 23 MMOL/L — SIGNIFICANT CHANGE UP (ref 22–31)
COVID-19 SPIKE DOMAIN AB INTERP: POSITIVE
COVID-19 SPIKE DOMAIN ANTIBODY RESULT: 147 U/ML — HIGH
CREAT SERPL-MCNC: 0.44 MG/DL — LOW (ref 0.5–1.3)
GLUCOSE SERPL-MCNC: 129 MG/DL — HIGH (ref 70–99)
HCT VFR BLD CALC: 33.4 % — LOW (ref 34.5–45)
HGB BLD-MCNC: 10.9 G/DL — LOW (ref 11.5–15.5)
MCHC RBC-ENTMCNC: 29.5 PG — SIGNIFICANT CHANGE UP (ref 27–34)
MCHC RBC-ENTMCNC: 32.6 GM/DL — SIGNIFICANT CHANGE UP (ref 32–36)
MCV RBC AUTO: 90.5 FL — SIGNIFICANT CHANGE UP (ref 80–100)
NRBC # BLD: 0 /100 WBCS — SIGNIFICANT CHANGE UP (ref 0–0)
PLATELET # BLD AUTO: 205 K/UL — SIGNIFICANT CHANGE UP (ref 150–400)
POTASSIUM SERPL-MCNC: 4 MMOL/L — SIGNIFICANT CHANGE UP (ref 3.5–5.3)
POTASSIUM SERPL-SCNC: 4 MMOL/L — SIGNIFICANT CHANGE UP (ref 3.5–5.3)
RBC # BLD: 3.69 M/UL — LOW (ref 3.8–5.2)
RBC # FLD: 14.2 % — SIGNIFICANT CHANGE UP (ref 10.3–14.5)
SARS-COV-2 IGG+IGM SERPL QL IA: 147 U/ML — HIGH
SARS-COV-2 IGG+IGM SERPL QL IA: POSITIVE
SODIUM SERPL-SCNC: 138 MMOL/L — SIGNIFICANT CHANGE UP (ref 135–145)
WBC # BLD: 14 K/UL — HIGH (ref 3.8–10.5)
WBC # FLD AUTO: 14 K/UL — HIGH (ref 3.8–10.5)

## 2021-10-07 PROCEDURE — 80048 BASIC METABOLIC PNL TOTAL CA: CPT

## 2021-10-07 PROCEDURE — 88311 DECALCIFY TISSUE: CPT

## 2021-10-07 PROCEDURE — 99233 SBSQ HOSP IP/OBS HIGH 50: CPT

## 2021-10-07 PROCEDURE — 97530 THERAPEUTIC ACTIVITIES: CPT

## 2021-10-07 PROCEDURE — 97535 SELF CARE MNGMENT TRAINING: CPT

## 2021-10-07 PROCEDURE — 86901 BLOOD TYPING SEROLOGIC RH(D): CPT

## 2021-10-07 PROCEDURE — 85027 COMPLETE CBC AUTOMATED: CPT

## 2021-10-07 PROCEDURE — 97161 PT EVAL LOW COMPLEX 20 MIN: CPT

## 2021-10-07 PROCEDURE — 88305 TISSUE EXAM BY PATHOLOGIST: CPT

## 2021-10-07 PROCEDURE — 86850 RBC ANTIBODY SCREEN: CPT

## 2021-10-07 PROCEDURE — 73501 X-RAY EXAM HIP UNI 1 VIEW: CPT

## 2021-10-07 PROCEDURE — 97165 OT EVAL LOW COMPLEX 30 MIN: CPT

## 2021-10-07 PROCEDURE — 97116 GAIT TRAINING THERAPY: CPT

## 2021-10-07 PROCEDURE — 86900 BLOOD TYPING SEROLOGIC ABO: CPT

## 2021-10-07 PROCEDURE — 36415 COLL VENOUS BLD VENIPUNCTURE: CPT

## 2021-10-07 PROCEDURE — 97110 THERAPEUTIC EXERCISES: CPT

## 2021-10-07 PROCEDURE — C1713: CPT

## 2021-10-07 PROCEDURE — C1776: CPT

## 2021-10-07 PROCEDURE — 86769 SARS-COV-2 COVID-19 ANTIBODY: CPT

## 2021-10-07 PROCEDURE — 94664 DEMO&/EVAL PT USE INHALER: CPT

## 2021-10-07 RX ORDER — ACETAMINOPHEN 500 MG
2 TABLET ORAL
Qty: 0 | Refills: 0 | DISCHARGE

## 2021-10-07 RX ORDER — LANOLIN ALCOHOL/MO/W.PET/CERES
5 CREAM (GRAM) TOPICAL AT BEDTIME
Refills: 0 | Status: DISCONTINUED | OUTPATIENT
Start: 2021-10-07 | End: 2021-10-07

## 2021-10-07 RX ORDER — OXYCODONE HYDROCHLORIDE 5 MG/1
1 TABLET ORAL
Qty: 42 | Refills: 0
Start: 2021-10-07 | End: 2021-10-13

## 2021-10-07 RX ADMIN — OXYCODONE HYDROCHLORIDE 10 MILLIGRAM(S): 5 TABLET ORAL at 06:01

## 2021-10-07 RX ADMIN — Medication 1000 MILLIGRAM(S): at 06:01

## 2021-10-07 RX ADMIN — Medication 81 MILLIGRAM(S): at 06:01

## 2021-10-07 RX ADMIN — OXYCODONE HYDROCHLORIDE 10 MILLIGRAM(S): 5 TABLET ORAL at 09:00

## 2021-10-07 RX ADMIN — Medication 1000 MILLIGRAM(S): at 13:03

## 2021-10-07 RX ADMIN — CELECOXIB 200 MILLIGRAM(S): 200 CAPSULE ORAL at 08:57

## 2021-10-07 RX ADMIN — Medication 5 MILLIGRAM(S): at 00:45

## 2021-10-07 RX ADMIN — OXYCODONE HYDROCHLORIDE 10 MILLIGRAM(S): 5 TABLET ORAL at 13:35

## 2021-10-07 RX ADMIN — Medication 1000 MILLIGRAM(S): at 06:11

## 2021-10-07 RX ADMIN — OXYCODONE HYDROCHLORIDE 10 MILLIGRAM(S): 5 TABLET ORAL at 09:40

## 2021-10-07 RX ADMIN — Medication 1000 MILLIGRAM(S): at 13:02

## 2021-10-07 RX ADMIN — Medication 1000 MILLIGRAM(S): at 01:15

## 2021-10-07 RX ADMIN — Medication 100 MILLIGRAM(S): at 02:58

## 2021-10-07 RX ADMIN — OXYCODONE HYDROCHLORIDE 10 MILLIGRAM(S): 5 TABLET ORAL at 13:02

## 2021-10-07 RX ADMIN — PANTOPRAZOLE SODIUM 40 MILLIGRAM(S): 20 TABLET, DELAYED RELEASE ORAL at 06:01

## 2021-10-07 RX ADMIN — Medication 400 MILLIGRAM(S): at 00:45

## 2021-10-07 NOTE — PROGRESS NOTE ADULT - SUBJECTIVE AND OBJECTIVE BOX
Discharge medication calendar:  Aspirin EC 81mg q12h x 6 weeks  APAP 1000mg q8h x 2-3 weeks  Celecoxib 200mg q12h x 21 days  Omeprazole 20mg QAM x 6 weeks  Narcotic PRN  Docusate 100mg TID while taking narcotic  Miralax, Senna, or Bisacodyl PRN for treatment of constipation  
JOSE DANIEL MURILLO [id number]    Pt is a 61y year old Female s/p right THR. pain is 5/10. Tolerating regular diet, (+) voids.  Denies chest pain/shortness of breath/nausea/vomitting.     Vital Signs Last 24 Hrs  T(C): 36.6 (07 Oct 2021 03:05), Max: 36.8 (06 Oct 2021 19:00)  T(F): 97.9 (07 Oct 2021 03:05), Max: 98.3 (06 Oct 2021 19:00)  HR: 52 (07 Oct 2021 03:05) (52 - 76)  BP: 107/71 (07 Oct 2021 03:05) (87/43 - 133/77)  BP(mean): --  RR: 16 (07 Oct 2021 03:05) (11 - 20)  SpO2: 97% (07 Oct 2021 03:05) (94% - 100%)    I&O's Detail    06 Oct 2021 07:01  -  07 Oct 2021 07:00  --------------------------------------------------------  IN:    IV PiggyBack: 150 mL    Lactated Ringers: 1125 mL    Lactated Ringers: 1700 mL    Oral Fluid: 240 mL    Sodium Chloride 0.9% Bolus: 500 mL  Total IN: 3715 mL    OUT:    Blood Loss (mL): 150 mL    Intermittent Catheterization - Urethral (mL): 500 mL  Total OUT: 650 mL    Total NET: 3065 mL                      PE:   RLE: Dressing CDI, Sensation intact to light touch distally,MESERET intact (+2) DP/PT pulses, EHL/FHL/TA intact, Capillary refill < 2 seconds. negative calf tenderness. PAS on.    A: 61y year old Female s/p right THR POD#1    Plan:   -DVT ppx = PAS +  aspirin enteric coated 81 milliGRAM(s) Oral every 12 hours    -PT/OT = OOB  -Hip dislocation precautions  -Pain control   -Medicine to follow   -Continue to Follow Labs  -Incentive spirometry  -Home planning
Orthopaedic Post Op Note    Procedure: Right THR  Surgeon: Maury Carballo    61y Female comfortable, without complaints. Resting in bed. Was up bedside with PT. Tolerating diet. Voiding. Reported pain score = 2  Denies N/V, CP, SOB, numbness/tingling of extremities.    PE:  Vital Signs Last 24 Hrs  T(C): 36.4 (06 Oct 2021 15:19), Max: 36.7 (06 Oct 2021 08:16)  T(F): 97.6 (06 Oct 2021 15:19), Max: 98 (06 Oct 2021 08:16)  HR: 72 (06 Oct 2021 15:19) (53 - 76)  BP: 107/51 (06 Oct 2021 15:19) (87/43 - 129/65)  RR: 16 (06 Oct 2021 15:19) (11 - 20)  SpO2: 97% (06 Oct 2021 15:19) (94% - 100%)  General: Pt alert and oriented   Lungs: + BS CTA bilaterally  Heart: +S1 & S2 heard, RRR  Abd: + BS heard, soft, NT, ND  Right Hip MESERET Dressing: C/D/I, green light on  Abd pillow in place  Bilateral LEs:  Motor:   5/5 dorsiflexion, plantarflexion, EHL  Sensation intact to LT  2+ DP Pulses  SCDs in place    A/P: 61y Female stable POD#0 s/p Right THR   -  Acetaminophen, Celebrex, Oxycodone, Dilaudid for Pain Control   - DVT ppx: Aspirin 81mg q 12h  - Shelley op IV abx: Ancef  - Celebrex for HO ppx  - total hip precautions  - PT, OT per protocol  - F/U AM Labs  -  Instructed MESERET use/care/removal  DCP = home tomorrow pending PT, OT, medical clearance        
Patient is a 61y old  Female who presents with a chief complaint of Right THR for severe right hip OA.   (06 Oct 2021 17:12)      INTERVAL HPI/OVERNIGHT EVENTS:  no overnight events  feels soreness, otherwise working well with PT.   passing gas.     MEDICATIONS  (STANDING):  acetaminophen   Tablet .. 1000 milliGRAM(s) Oral every 8 hours  aspirin enteric coated 81 milliGRAM(s) Oral every 12 hours  celecoxib 200 milliGRAM(s) Oral every 12 hours  lactated ringers. 1000 milliLiter(s) (125 mL/Hr) IV Continuous <Continuous>  pantoprazole    Tablet 40 milliGRAM(s) Oral before breakfast  polyethylene glycol 3350 17 Gram(s) Oral at bedtime  senna 2 Tablet(s) Oral at bedtime    MEDICATIONS  (PRN):  HYDROmorphone  Injectable 0.5 milliGRAM(s) IV Push every 3 hours PRN breakthrough pain  melatonin 5 milliGRAM(s) Oral at bedtime PRN Sleep  ondansetron Injectable 4 milliGRAM(s) IV Push every 6 hours PRN Nausea and/or Vomiting  oxyCODONE    IR 5 milliGRAM(s) Oral every 3 hours PRN Moderate Pain (4 - 6)  oxyCODONE    IR 10 milliGRAM(s) Oral every 3 hours PRN Severe Pain (7 - 10)      Allergies    No Known Drug Allergies  Peaches (Hives; Angioedema)  walnut (Hives; Angioedema)    Intolerances    Vital Signs Last 24 Hrs  T(C): 36.7 (07 Oct 2021 07:45), Max: 36.8 (06 Oct 2021 19:00)  T(F): 98 (07 Oct 2021 07:45), Max: 98.3 (06 Oct 2021 19:00)  HR: 50 (07 Oct 2021 07:45) (50 - 76)  BP: 108/69 (07 Oct 2021 07:45) (87/43 - 133/77)  BP(mean): --  RR: 17 (07 Oct 2021 07:45) (11 - 20)  SpO2: 98% (07 Oct 2021 07:45) (94% - 100%)    PHYSICAL EXAM:  GENERAL: NAD, well-groomed, well-developed  HEAD:  Atraumatic, Normocephalic  EYES: EOMI, PERRLA, conjunctiva and sclera clear  ENMT: Moist mucous membranes, No lesions; No tonsillar erythema, exudates, or enlargement  NECK: Supple, No JVD, Normal thyroid  NERVOUS SYSTEM:  Alert & Oriented X3, Good concentration; All 4 extremities mobile, no gross sensory deficits.   CHEST/LUNG: Clear to auscultation bilaterally; No rales, rhonchi, wheezing, or rubs  HEART: Regular rate and rhythm; No murmurs, rubs, or gallops  ABDOMEN: Soft, Nontender, Nondistended; Bowel sounds present  EXTREMITIES:  2+ Peripheral Pulses, No clubbing, cyanosis, or edema  LYMPH: No lymphadenopathy noted  SKIN: No rashes or lesions    LABS:                        10.9   14.00 )-----------( 205      ( 07 Oct 2021 08:05 )             33.4     07 Oct 2021 08:05    138    |  107    |  12     ----------------------------<  129    4.0     |  23     |  0.44     Ca    8.7        07 Oct 2021 08:05          CAPILLARY BLOOD GLUCOSE          RADIOLOGY & ADDITIONAL TESTS:    Imaging Personally Reviewed:  [ ] YES     Consultant(s) Notes Reviewed:      Care Discussed with Consultants/Other Providers:    Advanced Directives: [ ] DNR  [ ] No feeding tube  [ ] MOLST in chart  [ ] MOLST completed today  [ ] Unknown

## 2021-10-07 NOTE — PROGRESS NOTE ADULT - ASSESSMENT
60 yo f pmh OA is s/p Right THR    #S/P Right THR  Post op orders per ortho  Pain management  Bowl regimen  Pt eval  labs reviewed  leukocytosis likely reactive, asymptomatic  anemia likely from procedure, asymptomatic  Medically optimized for DC    #DVT proph  per ortho

## 2021-10-25 ENCOUNTER — APPOINTMENT (OUTPATIENT)
Dept: ORTHOPEDIC SURGERY | Facility: CLINIC | Age: 61
End: 2021-10-25
Payer: COMMERCIAL

## 2021-10-25 VITALS — DIASTOLIC BLOOD PRESSURE: 76 MMHG | SYSTOLIC BLOOD PRESSURE: 134 MMHG | HEART RATE: 79 BPM

## 2021-10-25 PROCEDURE — 99024 POSTOP FOLLOW-UP VISIT: CPT

## 2021-10-25 PROCEDURE — 73502 X-RAY EXAM HIP UNI 2-3 VIEWS: CPT | Mod: RT

## 2021-10-25 RX ORDER — HYDROMORPHONE HYDROCHLORIDE 2 MG/1
2 TABLET ORAL EVERY 6 HOURS
Qty: 84 | Refills: 0 | Status: ACTIVE | COMMUNITY
Start: 2021-10-25 | End: 1900-01-01

## 2021-10-29 RX ORDER — OMEPRAZOLE 20 MG/1
20 CAPSULE, DELAYED RELEASE ORAL
Qty: 30 | Refills: 0 | Status: ACTIVE | COMMUNITY
Start: 2021-10-29 | End: 1900-01-01

## 2021-10-29 RX ORDER — CELECOXIB 200 MG/1
200 CAPSULE ORAL
Qty: 30 | Refills: 2 | Status: ACTIVE | COMMUNITY
Start: 2017-09-19 | End: 1900-01-01

## 2021-11-30 ENCOUNTER — RX RENEWAL (OUTPATIENT)
Age: 61
End: 2021-11-30

## 2021-12-07 ENCOUNTER — APPOINTMENT (OUTPATIENT)
Dept: ORTHOPEDIC SURGERY | Facility: CLINIC | Age: 61
End: 2021-12-07
Payer: COMMERCIAL

## 2021-12-07 ENCOUNTER — TRANSCRIPTION ENCOUNTER (OUTPATIENT)
Age: 61
End: 2021-12-07

## 2021-12-07 VITALS — HEART RATE: 73 BPM | SYSTOLIC BLOOD PRESSURE: 105 MMHG | DIASTOLIC BLOOD PRESSURE: 71 MMHG

## 2021-12-07 DIAGNOSIS — Z96.641 PRESENCE OF RIGHT ARTIFICIAL HIP JOINT: ICD-10-CM

## 2021-12-07 DIAGNOSIS — R29.898 OTHER SYMPTOMS AND SIGNS INVOLVING THE MUSCULOSKELETAL SYSTEM: ICD-10-CM

## 2021-12-07 PROCEDURE — 73502 X-RAY EXAM HIP UNI 2-3 VIEWS: CPT | Mod: RT

## 2021-12-07 PROCEDURE — 73562 X-RAY EXAM OF KNEE 3: CPT | Mod: 50

## 2021-12-07 PROCEDURE — 99024 POSTOP FOLLOW-UP VISIT: CPT

## 2022-03-22 NOTE — PRE-OP CHECKLIST - ALLERGY BAND ON
[address] [facility] [place]. [place], [address]  Tel.  [phone]  Fax. 100 [facility] 60  [place], [address] [facility]  Tel.  [phone]  Fax. [phone] [address]  Tel.  [phone]  Fax. [phone]     Learning About CPAP for Sleep Apnea  What is CPAP? CPAP is a small machine that you use at home every night while you sleep. It increases air pressure in your throat to keep your airway open. When you have sleep apnea, this can help you sleep better so you feel much better. CPAP stands for \"continuous positive airway pressure. \"  The CPAP machine will have one of the following:  · A mask that covers your nose and mouth  · Prongs that fit into your nose  · A mask that covers your nose only, the most common type. This type is called NCPAP. The N stands for \"nasal.\"  Why is it done? CPAP is usually the best treatment for obstructive sleep apnea. It is the first treatment choice and the most widely used. Your doctor may suggest CPAP if you have:  · Moderate to severe sleep apnea. · Sleep apnea and coronary artery disease (CAD) or heart failure. How does it help? · CPAP can help you have more normal sleep, so you feel less sleepy and more alert during the daytime. · CPAP may help keep heart failure or other heart problems from getting worse. · NCPAP may help lower your blood pressure. · If you use CPAP, your bed partner may also sleep better because you are not snoring or restless. What are the side effects? Some people who use CPAP have:  · A dry or stuffy nose and a sore throat. · Irritated skin on the face. · Sore eyes. · Bloating. If you have any of these problems, work with your doctor to fix them. Here are some things you can try:  · Be sure the mask or nasal prongs fit well. · See if your doctor can adjust the pressure of your CPAP. · If your nose is dry, try a humidifier.   · If your nose is runny or stuffy, try decongestant medicine or a steroid nasal spray. If these things do not help, you might try a different type of machine. Some machines have air pressure that adjusts on its own. Others have air pressures that are different when you breathe in than when you breathe out. This may reduce discomfort caused by too much pressure in your nose. Where can you learn more? Go to [employer]  Enter Jessica Krishna in the search box to learn more about \"Learning About CPAP for Sleep Apnea. \"   © 3038-5546 [employer]. Care instructions adapted under license by [employer] (which disclaims liability or warranty for this information). This care instruction is for use with your licensed healthcare professional. If you have questions about a medical condition or this instruction, always ask your healthcare professional. [address] any warranty or liability for your use of this information. Content Version: 6.4.30878; Last Revised: January 11, 2010  PROPER SLEEP HYGIENE    What to avoid  · Do not have drinks with caffeine, such as coffee or black tea, for 8 hours before bed. · Do not smoke or use other types of tobacco near bedtime. Nicotine is a stimulant and can keep you awake. · Avoid drinking alcohol late in the evening, because it can cause you to wake in the middle of the night. · Do not eat a big meal close to bedtime. If you are hungry, eat a light snack. · Do not drink a lot of water close to bedtime, because the need to urinate may wake you up during the night. · Do not read or watch TV in bed. Use the bed only for sleeping and sexual activity. What to try  · Go to bed at the same time every night, and wake up at the same time every morning. Do not take naps during the day. · Keep your bedroom quiet, dark, and cool. · Get regular exercise, but not within 3 to 4 hours of your bedtime. .  · Sleep on a comfortable pillow and mattress.   · If watching the clock makes you anxious, turn it facing away from you so you cannot see the time. · If you worry when you lie down, start a worry book. Well before bedtime, write down your worries, and then set the book and your concerns aside. · Try meditation or other relaxation techniques before you go to bed. · If you cannot fall asleep, get up and go to another room until you feel sleepy. Do something relaxing. Repeat your bedtime routine before you go to bed again. · Make your house quiet and calm about an hour before bedtime. Turn down the lights, turn off the TV, log off the computer, and turn down the volume on music. This can help you relax after a busy day. Drowsy Driving: The [facility] cites drowsiness as a causing factor in more than 549,115 police reported crashes annually, resulting in 76,000 injuries and 1,500 deaths. Other surveys suggest 55% of people polled have driven while drowsy in the past year, 23% had fallen asleep but not crashed, 3% crashed, and 2% had and accident due to drowsy driving. Who is at risk? Young Drivers: One study of drowsy driving accidents states that 55% of the drivers were under 25 years. Of those, 75% were male. Shift Workers and Travelers: People who work overnight or travel across time zones frequently are at higher risk of experiencing Circadian Rhythm Disorders. They are trying to work and function when their body is programed to sleep. Sleep Deprived: Lack of sleep has a serious impact on your ability to pay attention or focus on a task. Consistently getting less than the average of 8 hours your body needs creates partial or cumulative sleep deprivation. Untreated Sleep Disorders: Sleep Apnea, Narcolepsy, R.L.S., and other sleep disorders (untreated) prevent a person from getting enough restful sleep. This leads to excessive daytime sleepiness and increases the risk for drowsy driving accidents by up to 7 times.   Medications / Alcohol: Even over the counter medications can cause drowsiness. Medications that impair a drivers attention should have a warning label. Alcohol naturally makes you sleepy and on its own can cause accidents. Combined with excessive drowsiness its effects are amplified. Signs of Drowsy Driving:   * You don't remember driving the last few miles   * You may drift out of your melissa   * You are unable to focus and your thoughts wander   * You may yawn more often than normal   * You have difficulty keeping your eyes open / nodding off   * Missing traffic signs, speeding, or tailgating  Prevention-   Good sleep hygiene, lifestyle and behavioral choices have the most impact on drowsy driving. There is no substitute for sleep and the average person requires 8 hours nightly. If you find yourself driving drowsy, stop and sleep. Consider the sleep hygiene tips provided during your visit as well. Medication Refill Policy: Refills for all medications require 1 week advance notice. Please have your pharmacy fax a refill request. We are unable to fax, or call in \"controled substance\" medications and you will need to pick these prescriptions up from our office. Bug[employer]uster Activation    Thank you for requesting access to [employer]. Please follow the instructions below to securely access and download your online medical record. Bug[employer] allows you to send messages to your doctor, view your test results, renew your prescriptions, schedule appointments, and more. How Do I Sign Up? 1. In your internet browser, go to [email or web address]. 2. Click on the First Time User? Click Here link in the Sign In box. You will see the New Member Sign Up page. 3. Enter your BugBuster Access Code exactly as it appears below. You will not need to use this code after youve completed the sign-up process. If you do not sign up before the expiration date, you must request a new code.     Bug[employer] Access Code: [email or web address]  Expires: 2022  3:19 PM (This is the date your Groupspeak access code will )    4. Enter the last four digits of your Social Security Number (xxxx) and Date of Birth (mm/dd/yyyy) as indicated and click Submit. You will be taken to the next sign-up page. 5. Create a Groupspeak ID. This will be your Groupspeak login ID and cannot be changed, so think of one that is secure and easy to remember. 6. Create a Groupspeak password. You can change your password at any time. 7. Enter your Password Reset Question and Answer. This can be used at a later time if you forget your password. 8. Enter your e-mail address. You will receive e-mail notification when new information is available in [address] 9. Click Sign Up. You can now view and download portions of your medical record. 10. Click the Download Summary menu link to download a portable copy of your medical information. Additional Information    If you have questions, please call [phone]. Remember, Groupspeak is NOT to be used for urgent needs. For medical emergencies, dial 911. done

## 2022-09-14 NOTE — DISCHARGE NOTE PROVIDER - NSDCHHCONTACT_GEN_ALL_CORE_FT
What Is The Reason For Today's Visit?: History of Non-Melanoma Skin Cancer How Many Skin Cancers Have You Had?: one As certified below, I, or a nurse practitioner or physician assistant working with me, had a face-to-face encounter that meets the physician face-to-face encounter requirements.

## 2023-03-28 NOTE — PRE-OP CHECKLIST - CHLOROHEXIDINE WASH IN ASU
Orders updated.  
Patient coming in on  for Dr Srivastava orders but  - please advise, thank you!   
06-Mar-2017 06:30

## 2023-09-21 ASSESSMENT — HOOS JR
IMPORTED FORM: YES
LYING IN BED (TURNING OVER, MAINTAINING HIP POSITION): SEVERE
SITTING: SEVERE
RISING FROM SITTING: SEVERE
WALKING ON UNEVEN SURFACE: SEVERE
GOING UP OR DOWN STAIRS: SEVERE
BENDING TO THE FLOOR TO PICK UP OBJECT: SEVERE
IMPORTED HOOS JR SCORE: 18.0
HOOS JR RAW SCORE: 18

## 2023-10-17 NOTE — H&P PST ADULT - NECK DETAILS
"Subjective   Patient ID: Serenity Russell is a 58 y.o. female who presents for Sinusitis (PATIENT COMPLAINS OF SINUS PRESSURE, COUGH AND CONGESTION X 1 WEEK. NEGATIVE COVID TEST  ON SATURDAY).    Sinusitis  Associated symptoms include congestion, coughing, ear pain and sinus pressure.      For about 10 days has had sinus pressure,  right ear plugging, and severe cough.  No fever or sob.    Review of Systems   Constitutional:  Negative for fatigue and fever.   HENT:  Positive for congestion, ear pain, rhinorrhea, sinus pressure and sinus pain. Negative for ear discharge.    Respiratory:  Positive for cough and wheezing.    Cardiovascular: Negative.        Objective   /70 (BP Location: Left arm, Patient Position: Sitting, BP Cuff Size: Adult)   Pulse 70   Temp 37 °C (98.6 °F) (Skin)   Resp 19   Ht 1.6 m (5' 3\")   Wt 86.6 kg (191 lb)   SpO2 98%   BMI 33.83 kg/m²     Physical Exam  Constitutional:       General: She is not in acute distress.     Appearance: Normal appearance.   HENT:      Right Ear: Tympanic membrane and ear canal normal. There is no impacted cerumen.      Left Ear: Tympanic membrane and ear canal normal. There is no impacted cerumen.      Nose: Congestion present.      Mouth/Throat:      Pharynx: No oropharyngeal exudate or posterior oropharyngeal erythema.   Eyes:      Extraocular Movements: Extraocular movements intact.      Conjunctiva/sclera: Conjunctivae normal.      Pupils: Pupils are equal, round, and reactive to light.   Cardiovascular:      Rate and Rhythm: Normal rate and regular rhythm.      Heart sounds: No murmur heard.  Pulmonary:      Breath sounds: Normal breath sounds. No wheezing.   Neurological:      Mental Status: She is alert.         Assessment/Plan   Problem List Items Addressed This Visit    None  Visit Diagnoses         Codes    Acute non-recurrent maxillary sinusitis    -  Primary J01.00    Relevant Medications    amoxicillin-pot clavulanate (Augmentin) 875-125 mg " tablet    Acute non-recurrent frontal sinusitis     J01.10                normal/supple

## 2024-04-02 NOTE — H&P PST ADULT - AS O2 DELIVERY
Detail Level: Detailed Quality 226: Preventive Care And Screening: Tobacco Use: Screening And Cessation Intervention: Patient screened for tobacco use and is an ex/non-smoker room air

## 2025-05-31 NOTE — OCCUPATIONAL THERAPY INITIAL EVALUATION ADULT - EATING, PREVIOUS LEVEL OF FUNCTION, OT EVAL
Take the full course of antibiotics as prescribed.  Immediately turn to the emergency department for chest pain, shortness of breath, fever, any other new or worsening symptoms concerning to you.  
independent